# Patient Record
Sex: MALE | Race: WHITE | NOT HISPANIC OR LATINO
[De-identification: names, ages, dates, MRNs, and addresses within clinical notes are randomized per-mention and may not be internally consistent; named-entity substitution may affect disease eponyms.]

---

## 2019-11-10 ENCOUNTER — TRANSCRIPTION ENCOUNTER (OUTPATIENT)
Age: 79
End: 2019-11-10

## 2019-12-13 ENCOUNTER — LABORATORY RESULT (OUTPATIENT)
Age: 79
End: 2019-12-13

## 2019-12-13 ENCOUNTER — APPOINTMENT (OUTPATIENT)
Dept: PULMONOLOGY | Facility: CLINIC | Age: 79
End: 2019-12-13
Payer: MEDICARE

## 2019-12-13 VITALS
OXYGEN SATURATION: 100 % | WEIGHT: 168 LBS | TEMPERATURE: 98.7 F | HEART RATE: 66 BPM | DIASTOLIC BLOOD PRESSURE: 79 MMHG | SYSTOLIC BLOOD PRESSURE: 128 MMHG | HEIGHT: 71 IN | RESPIRATION RATE: 15 BRPM | BODY MASS INDEX: 23.52 KG/M2

## 2019-12-13 DIAGNOSIS — R91.8 OTHER NONSPECIFIC ABNORMAL FINDING OF LUNG FIELD: ICD-10-CM

## 2019-12-13 PROCEDURE — 94750: CPT

## 2019-12-13 PROCEDURE — 94726 PLETHYSMOGRAPHY LUNG VOLUMES: CPT

## 2019-12-13 PROCEDURE — 99204 OFFICE O/P NEW MOD 45 MIN: CPT | Mod: 25

## 2019-12-13 PROCEDURE — 36415 COLL VENOUS BLD VENIPUNCTURE: CPT

## 2019-12-13 PROCEDURE — 94664 DEMO&/EVAL PT USE INHALER: CPT | Mod: 59

## 2019-12-13 PROCEDURE — 94060 EVALUATION OF WHEEZING: CPT

## 2019-12-13 PROCEDURE — 88738 HGB QUANT TRANSCUTANEOUS: CPT

## 2019-12-13 PROCEDURE — 94729 DIFFUSING CAPACITY: CPT

## 2019-12-13 RX ORDER — TAMSULOSIN HYDROCHLORIDE 0.4 MG/1
0.4 CAPSULE ORAL
Refills: 0 | Status: ACTIVE | COMMUNITY

## 2019-12-13 NOTE — PHYSICAL EXAM
[General Appearance - Well Developed] : well developed [General Appearance - Well Nourished] : well nourished [Jugular Venous Distention Increased] : there was no jugular-venous distention [Thyroid Diffuse Enlargement] : the thyroid was not enlarged [Heart Sounds] : normal S1 and S2 [Murmurs] : no murmurs present [Auscultation Breath Sounds / Voice Sounds] : lungs were clear to auscultation bilaterally [Lungs Percussion] : the lungs were normal to percussion [Abdomen Soft] : soft [Abdomen Tenderness] : non-tender [] : no hepato-splenomegaly [Abdomen Mass (___ Cm)] : no abdominal mass palpated [Cyanosis, Localized] : no localized cyanosis [Nail Clubbing] : no clubbing of the fingernails

## 2019-12-16 LAB
ANA SER IF-ACNC: NEGATIVE
CK SERPL-CCNC: 140 U/L
CRP SERPL-MCNC: <0.1 MG/DL
ENA JO1 AB SER IA-ACNC: <0.2 AL
ENA SCL70 IGG SER IA-ACNC: <0.2 AL
ERYTHROCYTE [SEDIMENTATION RATE] IN BLOOD BY WESTERGREN METHOD: 8 MM/HR
HIV1+2 AB SPEC QL IA.RAPID: NONREACTIVE
MPO AB + PR3 PNL SER: NORMAL
POCT - HEMOGLOBIN (HGB), QUANTITATIVE, TRANSCUTANEOUS: 11
RHEUMATOID FACT SER QL: <10 IU/ML

## 2019-12-16 NOTE — ASSESSMENT
[FreeTextEntry1] : Repeat CT 6 months with prone images.\par Labs drawn in office today\par F/U sooner if change in respiratory status.

## 2019-12-16 NOTE — PROCEDURE
[FreeTextEntry1] : 12/13/2019\par Pulmonary function testing\par FEV1, FVC, and FEV1/FVC are within normal limits. There was not a significant response to inhaled bronchodilator. TLC is normal. FRC is increased. RV is increased.  RV/TLC ratio is increased. Resistance and specific conductance are normal. Single breath diffusion capacity is normal. \par \par CT reviewed\par note: tree in bud infiltrates, mild bronchiectasis\par Subpleural ground glass and some fibrotic opacities predom. posterior\par Nodules maximum 8 mm

## 2019-12-16 NOTE — HISTORY OF PRESENT ILLNESS
[FreeTextEntry1] : YAMILKA SUAZO is a 79 year old  M referred for pulmonary evaluation for wt loss\par \par Recent CT C/A/P recently told small spot on lung. Was not concerned. \par \par No significant cough, wheezing, chest pain or SOB.\par \par Wt loss approx. 40 pounds few years. Eating well\par \par No fever, chills, night sweats. Recently wt more stable.\par \par \par Past pulmonary history. N\par Occupational Exposure. [place] present on 9/11. Out for 1 week\par Family history of pulmonary disease. N\par Recent travel [place] July\par Pets N\par \par Some stress and anxiety\par Sleeps OK\par \par Some difficulty with swallowing. Has to get up from table and take fluids. Sometimes small particles can get stuck such as rice. \par Had achalasia as young adult. \par \par Told Quantiferon negative

## 2019-12-16 NOTE — DISCUSSION/SUMMARY
[FreeTextEntry1] : CT findings most likely related to prior injury or low-grade chronic infectious etiologies such as atypical mycobacterial disease. Less likely systemic inflammatory process.\par Possibility of silent aspiration is also a consideration in this patient.\par \par Some of the radiographic changes may be related to position and on repeat CAT scan we will obtain prone images.\par \par Unlikely weight loss is related to abnormalities noted on CAT scan of the chest

## 2019-12-16 NOTE — CONSULT LETTER
[Dear  ___] : Dear ~DWIGHT, [Consult Letter:] : I had the pleasure of evaluating your patient, [unfilled]. [Please see my note below.] : Please see my note below. [Consult Closing:] : Thank you very much for allowing me to participate in the care of this patient.  If you have any questions, please do not hesitate to contact me. [Sincerely,] : Sincerely, [FreeTextEntry2] : Carlos Marin MD [FreeTextEntry3] : Stas Sanz MD FCCP\par

## 2019-12-17 LAB — ACE BLD-CCNC: 23 U/L

## 2020-07-20 ENCOUNTER — APPOINTMENT (OUTPATIENT)
Dept: GASTROENTEROLOGY | Facility: CLINIC | Age: 80
End: 2020-07-20
Payer: MEDICARE

## 2020-07-20 DIAGNOSIS — K22.0 ACHALASIA OF CARDIA: ICD-10-CM

## 2020-07-20 PROCEDURE — 99442: CPT | Mod: 95

## 2020-08-02 DIAGNOSIS — Z01.818 ENCOUNTER FOR OTHER PREPROCEDURAL EXAMINATION: ICD-10-CM

## 2020-08-03 ENCOUNTER — APPOINTMENT (OUTPATIENT)
Dept: DISASTER EMERGENCY | Facility: CLINIC | Age: 80
End: 2020-08-03

## 2020-08-03 LAB — SARS-COV-2 N GENE NPH QL NAA+PROBE: NOT DETECTED

## 2020-08-09 ENCOUNTER — APPOINTMENT (OUTPATIENT)
Dept: DISASTER EMERGENCY | Facility: CLINIC | Age: 80
End: 2020-08-09

## 2020-08-09 LAB — SARS-COV-2 N GENE NPH QL NAA+PROBE: NOT DETECTED

## 2020-08-12 ENCOUNTER — OUTPATIENT (OUTPATIENT)
Dept: OUTPATIENT SERVICES | Facility: HOSPITAL | Age: 80
LOS: 1 days | Discharge: HOME | End: 2020-08-12
Payer: COMMERCIAL

## 2020-08-12 ENCOUNTER — APPOINTMENT (OUTPATIENT)
Dept: GASTROENTEROLOGY | Facility: HOSPITAL | Age: 80
End: 2020-08-12

## 2020-08-12 VITALS
SYSTOLIC BLOOD PRESSURE: 108 MMHG | HEART RATE: 65 BPM | WEIGHT: 151.02 LBS | TEMPERATURE: 97 F | HEIGHT: 70 IN | OXYGEN SATURATION: 100 % | DIASTOLIC BLOOD PRESSURE: 67 MMHG

## 2020-08-12 DIAGNOSIS — K22.0 ACHALASIA OF CARDIA: ICD-10-CM

## 2020-08-12 PROCEDURE — 91037 ESOPH IMPED FUNCTION TEST: CPT | Mod: 26,GC

## 2020-08-12 PROCEDURE — 91010 ESOPHAGUS MOTILITY STUDY: CPT | Mod: 26,GC

## 2020-08-24 PROBLEM — K22.0 ACHALASIA: Status: ACTIVE | Noted: 2020-07-20

## 2020-11-14 ENCOUNTER — APPOINTMENT (OUTPATIENT)
Dept: DISASTER EMERGENCY | Facility: CLINIC | Age: 80
End: 2020-11-14

## 2020-11-15 LAB — SARS-COV-2 N GENE NPH QL NAA+PROBE: NOT DETECTED

## 2022-01-25 ENCOUNTER — INPATIENT (INPATIENT)
Facility: HOSPITAL | Age: 82
LOS: 5 days | Discharge: HOME HEALTH | DRG: 563 | End: 2022-01-31
Attending: INTERNAL MEDICINE | Admitting: INTERNAL MEDICINE
Payer: MEDICARE

## 2022-01-25 VITALS
TEMPERATURE: 98 F | RESPIRATION RATE: 16 BRPM | OXYGEN SATURATION: 99 % | WEIGHT: 175.93 LBS | DIASTOLIC BLOOD PRESSURE: 83 MMHG | HEIGHT: 70 IN | SYSTOLIC BLOOD PRESSURE: 146 MMHG | HEART RATE: 77 BPM

## 2022-01-25 DIAGNOSIS — S42.209A UNSPECIFIED FRACTURE OF UPPER END OF UNSPECIFIED HUMERUS, INITIAL ENCOUNTER FOR CLOSED FRACTURE: ICD-10-CM

## 2022-01-25 DIAGNOSIS — S42.309A UNSPECIFIED FRACTURE OF SHAFT OF HUMERUS, UNSPECIFIED ARM, INITIAL ENCOUNTER FOR CLOSED FRACTURE: ICD-10-CM

## 2022-01-25 DIAGNOSIS — M89.9 DISORDER OF BONE, UNSPECIFIED: ICD-10-CM

## 2022-01-25 DIAGNOSIS — K22.0 ACHALASIA OF CARDIA: ICD-10-CM

## 2022-01-25 DIAGNOSIS — R91.8 OTHER NONSPECIFIC ABNORMAL FINDING OF LUNG FIELD: ICD-10-CM

## 2022-01-25 DIAGNOSIS — Z93.1 GASTROSTOMY STATUS: Chronic | ICD-10-CM
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
## 2022-01-25 DIAGNOSIS — Z29.9 ENCOUNTER FOR PROPHYLACTIC MEASURES, UNSPECIFIED: ICD-10-CM

## 2022-01-25 LAB
ACANTHOCYTES BLD QL SMEAR: SLIGHT — SIGNIFICANT CHANGE UP
ALBUMIN SERPL ELPH-MCNC: 4.1 G/DL — SIGNIFICANT CHANGE UP (ref 3.3–5)
ALP SERPL-CCNC: 78 U/L — SIGNIFICANT CHANGE UP (ref 40–120)
ALT FLD-CCNC: 41 U/L — SIGNIFICANT CHANGE UP (ref 10–45)
ANION GAP SERPL CALC-SCNC: 10 MMOL/L — SIGNIFICANT CHANGE UP (ref 5–17)
ANISOCYTOSIS BLD QL: SLIGHT — SIGNIFICANT CHANGE UP
AST SERPL-CCNC: 24 U/L — SIGNIFICANT CHANGE UP (ref 10–40)
BASOPHILS # BLD AUTO: 0 K/UL — SIGNIFICANT CHANGE UP (ref 0–0.2)
BASOPHILS NFR BLD AUTO: 0 % — SIGNIFICANT CHANGE UP (ref 0–2)
BILIRUB SERPL-MCNC: 0.4 MG/DL — SIGNIFICANT CHANGE UP (ref 0.2–1.2)
BUN SERPL-MCNC: 28 MG/DL — HIGH (ref 7–23)
CALCIUM SERPL-MCNC: 9.1 MG/DL — SIGNIFICANT CHANGE UP (ref 8.4–10.5)
CHLORIDE SERPL-SCNC: 103 MMOL/L — SIGNIFICANT CHANGE UP (ref 96–108)
CO2 SERPL-SCNC: 25 MMOL/L — SIGNIFICANT CHANGE UP (ref 22–31)
CREAT SERPL-MCNC: 0.67 MG/DL — SIGNIFICANT CHANGE UP (ref 0.5–1.3)
DACRYOCYTES BLD QL SMEAR: SLIGHT — SIGNIFICANT CHANGE UP
ELLIPTOCYTES BLD QL SMEAR: SLIGHT — SIGNIFICANT CHANGE UP
EOSINOPHIL # BLD AUTO: 0 K/UL — SIGNIFICANT CHANGE UP (ref 0–0.5)
EOSINOPHIL NFR BLD AUTO: 0 % — SIGNIFICANT CHANGE UP (ref 0–6)
GLUCOSE SERPL-MCNC: 130 MG/DL — HIGH (ref 70–99)
HCT VFR BLD CALC: 30.6 % — LOW (ref 39–50)
HGB BLD-MCNC: 9.6 G/DL — LOW (ref 13–17)
HYPOCHROMIA BLD QL: SLIGHT — SIGNIFICANT CHANGE UP
LYMPHOCYTES # BLD AUTO: 0.35 K/UL — LOW (ref 1–3.3)
LYMPHOCYTES # BLD AUTO: 2.6 % — LOW (ref 13–44)
MACROCYTES BLD QL: SLIGHT — SIGNIFICANT CHANGE UP
MANUAL SMEAR VERIFICATION: SIGNIFICANT CHANGE UP
MCHC RBC-ENTMCNC: 21.7 PG — LOW (ref 27–34)
MCHC RBC-ENTMCNC: 31.4 GM/DL — LOW (ref 32–36)
MCV RBC AUTO: 69.2 FL — LOW (ref 80–100)
MICROCYTES BLD QL: SIGNIFICANT CHANGE UP
MONOCYTES # BLD AUTO: 0.58 K/UL — SIGNIFICANT CHANGE UP (ref 0–0.9)
MONOCYTES NFR BLD AUTO: 4.4 % — SIGNIFICANT CHANGE UP (ref 2–14)
NEUTROPHILS # BLD AUTO: 12.24 K/UL — HIGH (ref 1.8–7.4)
NEUTROPHILS NFR BLD AUTO: 90.3 % — HIGH (ref 43–77)
NEUTS BAND # BLD: 1.8 % — SIGNIFICANT CHANGE UP (ref 0–8)
OVALOCYTES BLD QL SMEAR: SLIGHT — SIGNIFICANT CHANGE UP
PLAT MORPH BLD: NORMAL — SIGNIFICANT CHANGE UP
PLATELET # BLD AUTO: 190 K/UL — SIGNIFICANT CHANGE UP (ref 150–400)
POLYCHROMASIA BLD QL SMEAR: SLIGHT — SIGNIFICANT CHANGE UP
POTASSIUM SERPL-MCNC: 4.3 MMOL/L — SIGNIFICANT CHANGE UP (ref 3.5–5.3)
POTASSIUM SERPL-SCNC: 4.3 MMOL/L — SIGNIFICANT CHANGE UP (ref 3.5–5.3)
PROCALCITONIN SERPL-MCNC: 0.04 NG/ML — SIGNIFICANT CHANGE UP (ref 0.02–0.1)
PROT SERPL-MCNC: 6.7 G/DL — SIGNIFICANT CHANGE UP (ref 6–8.3)
RBC # BLD: 4.42 M/UL — SIGNIFICANT CHANGE UP (ref 4.2–5.8)
RBC # FLD: 15.8 % — HIGH (ref 10.3–14.5)
RBC BLD AUTO: ABNORMAL
SARS-COV-2 RNA SPEC QL NAA+PROBE: SIGNIFICANT CHANGE UP
SCHISTOCYTES BLD QL AUTO: SLIGHT — SIGNIFICANT CHANGE UP
SODIUM SERPL-SCNC: 138 MMOL/L — SIGNIFICANT CHANGE UP (ref 135–145)
TARGETS BLD QL SMEAR: SLIGHT — SIGNIFICANT CHANGE UP
VARIANT LYMPHS # BLD: 0.9 % — SIGNIFICANT CHANGE UP (ref 0–6)
WBC # BLD: 13.29 K/UL — HIGH (ref 3.8–10.5)
WBC # FLD AUTO: 13.29 K/UL — HIGH (ref 3.8–10.5)

## 2022-01-25 PROCEDURE — 72170 X-RAY EXAM OF PELVIS: CPT | Mod: 26

## 2022-01-25 PROCEDURE — 71045 X-RAY EXAM CHEST 1 VIEW: CPT | Mod: 26

## 2022-01-25 PROCEDURE — 99285 EMERGENCY DEPT VISIT HI MDM: CPT | Mod: 25

## 2022-01-25 PROCEDURE — 73070 X-RAY EXAM OF ELBOW: CPT | Mod: 26,LT

## 2022-01-25 PROCEDURE — 72125 CT NECK SPINE W/O DYE: CPT | Mod: 26,MA

## 2022-01-25 PROCEDURE — 83020 HEMOGLOBIN ELECTROPHORESIS: CPT | Mod: 26

## 2022-01-25 PROCEDURE — 99223 1ST HOSP IP/OBS HIGH 75: CPT

## 2022-01-25 PROCEDURE — 73060 X-RAY EXAM OF HUMERUS: CPT | Mod: 26,LT

## 2022-01-25 PROCEDURE — 73020 X-RAY EXAM OF SHOULDER: CPT | Mod: 26,LT,59

## 2022-01-25 PROCEDURE — 73030 X-RAY EXAM OF SHOULDER: CPT | Mod: 26,LT

## 2022-01-25 PROCEDURE — 70450 CT HEAD/BRAIN W/O DYE: CPT | Mod: 26,MA

## 2022-01-25 PROCEDURE — 93010 ELECTROCARDIOGRAM REPORT: CPT

## 2022-01-25 RX ORDER — POLYETHYLENE GLYCOL 3350 17 G/17G
17 POWDER, FOR SOLUTION ORAL DAILY
Refills: 0 | Status: DISCONTINUED | OUTPATIENT
Start: 2022-01-25 | End: 2022-01-31

## 2022-01-25 RX ORDER — FLUOXETINE HCL 10 MG
20 CAPSULE ORAL DAILY
Refills: 0 | Status: DISCONTINUED | OUTPATIENT
Start: 2022-01-25 | End: 2022-01-31

## 2022-01-25 RX ORDER — OXYCODONE HYDROCHLORIDE 5 MG/1
2.5 TABLET ORAL EVERY 6 HOURS
Refills: 0 | Status: DISCONTINUED | OUTPATIENT
Start: 2022-01-25 | End: 2022-01-31

## 2022-01-25 RX ORDER — ACETAMINOPHEN 500 MG
650 TABLET ORAL EVERY 6 HOURS
Refills: 0 | Status: DISCONTINUED | OUTPATIENT
Start: 2022-01-25 | End: 2022-01-31

## 2022-01-25 RX ORDER — LANOLIN ALCOHOL/MO/W.PET/CERES
3 CREAM (GRAM) TOPICAL AT BEDTIME
Refills: 0 | Status: DISCONTINUED | OUTPATIENT
Start: 2022-01-25 | End: 2022-01-31

## 2022-01-25 RX ORDER — ACETAMINOPHEN 500 MG
650 TABLET ORAL EVERY 6 HOURS
Refills: 0 | Status: DISCONTINUED | OUTPATIENT
Start: 2022-01-25 | End: 2022-01-25

## 2022-01-25 RX ORDER — ENOXAPARIN SODIUM 100 MG/ML
40 INJECTION SUBCUTANEOUS DAILY
Refills: 0 | Status: DISCONTINUED | OUTPATIENT
Start: 2022-01-25 | End: 2022-01-31

## 2022-01-25 RX ORDER — ACETAMINOPHEN 500 MG
650 TABLET ORAL ONCE
Refills: 0 | Status: COMPLETED | OUTPATIENT
Start: 2022-01-25 | End: 2022-01-25

## 2022-01-25 RX ADMIN — Medication 3 MILLIGRAM(S): at 23:02

## 2022-01-25 RX ADMIN — Medication 650 MILLIGRAM(S): at 16:59

## 2022-01-25 NOTE — CONSULT NOTE ADULT - ASSESSMENT
A/P: 82yo Male with L proximal humerus fracture    Pain control  NWB ANTONETTEE in sling  PT/OT  Please call if you have further question  Can follow up with Dr. Hernandez in 1-2 weeks or upon discharge    Ortho [id number]

## 2022-01-25 NOTE — H&P ADULT - NSHPLABSRESULTS_GEN_ALL_CORE
EKG reviewed.   Imaging reviewed. CXR shows L mid lung opacity concerning for developing PNA  Labs personally reviewed.     < from: CT Head No Cont (01.25.22 @ 16:32) >    There is no acute hemorrhage mass mass effect seen    There is evidence of an expansile lytic lesion seen involving the high   left frontal calvarium. This could be compatible with underlying   metastasis, multiple myeloma, lymphoma or hemangioma or other similar   lesions cannot be entirely excluded. Contrast enhanced MRI of the brain   can be done to better characterize this finding.    Cervical spine:    Loss of the normal cervical lordosis seen    C2 is slightly anteriorly displaced relative C3 and C3 is slightly   anteriorly displaced relative C4. These findings likely the result of   chronic degenerative change    Fusion is seen involving the posterior elements of C2, C3 and C4 which is   likely result of degenerative changes.    Disc space narrowing templates for change in mass effect seen involving   the cervical spine is seen which is secondary to chronic degenerative   change    Bilateral hypertrophic facet changes are seen, these findings are   secondary tochronic degenerative changes.    Evaluation of the paraspinal soft tissues limited by lack of IV contrast   though grossly unremarkable.    The visualized portions of both lung apices appear clear.    IMPRESSION: Lytic lesion involving the calvarium as described above.    Degenerative changes involving the cervical spine.    --- End of Report ---    < end of copied text >

## 2022-01-25 NOTE — H&P ADULT - PROBLEM SELECTOR PLAN 3
Patient incidentally found to have an expansile lytic lesion seen involving the high left frontal calvarium on CTH. This could be compatible with underlying metastasis, multiple myeloma, lymphoma or hemangioma or other similar   - will obtain MRI brain w/ contrast for better characterization  - f/u SPEP, UPEP, alkaline phosphatase  - onc consult in am given concern for malignancy

## 2022-01-25 NOTE — H&P ADULT - PROBLEM SELECTOR PLAN 1
Patient presents with a mechanical fall, landing on his LUE. Xray imaging shows Comminuted proximal humerus fracture at the level of the surgical neck with severe apex anterior angulation  - ortho recs appreciated, NWB LUE in sling  - f/u PT/OT  - pain control  - Can follow up with Dr. Hernandez in 1-2 weeks or upon discharge

## 2022-01-25 NOTE — H&P ADULT - ASSESSMENT
81M with PMH of achalasia s/p PEG (planned for removal in Feb), now tolerating regular diet, presents s/p mechanical fall found to have L humerus fracture.

## 2022-01-25 NOTE — H&P ADULT - PROBLEM SELECTOR PLAN 4
Patient found to have a faint left mid lower lung opacity which may represent developing pneumonia. Patient denies any fevers or cough  - WBC mildly elevated at 13 with 1.8% bands  - f/u procal  - if elevated, will start on abx for CAP  - continue to trend CBC Patient found to have a faint left mid lower lung opacity which may represent developing pneumonia. Patient denies any fevers or cough  - WBC mildly elevated at 13 with 1.8% bands  - f/u procal  - if elevated, will start on abx for CAP  - if procal wnl, may need repeat imaging to f/u lung opacity  - continue to trend CBC Patient found to have a faint left mid lower lung opacity which may represent developing pneumonia. Patient denies any fevers or cough  - WBC mildly elevated at 13 with 1.8% bands  - f/u procal  - if elevated, will start on abx for CAP  - if procal wnl, may need CT chest to f/u lung opacity  - continue to trend CBC Patient found to have a faint left mid lower lung opacity which may represent developing pneumonia. Patient denies any fevers or cough  - WBC mildly elevated at 13 with 1.8% bands  - given procal is negative will obtain CT chest to assess lung opacity  - continue to trend CBC

## 2022-01-25 NOTE — CONSULT NOTE ADULT - SUBJECTIVE AND OBJECTIVE BOX
80yo Male RHD PMH of Achalasia s/p PEG tube c/o L shoulder pain s/p fall. Reports that he fell while getting the mail. Reports head strike. Patient denies LOC. Reports right posterior scalp pain. Patient denies numbness or tingling in the LUE. Patient denies any other injuries. Uses a walker at baseline.     PMH:  Achalasia      PSH: Denies     AH: NKDA    Meds: See med rec    T(C): 36.9 (01-25-22 @ 14:58)  HR: 77 (01-25-22 @ 14:58)  BP: 146/83 (01-25-22 @ 14:58)  RR: 16 (01-25-22 @ 14:58)  SpO2: 99% (01-25-22 @ 14:58)  Wt(kg): --    PE LUE:  Skin intact, ecchymosis over the shoulder    SILT axillary/med/rad/ulnar  +Motor AIN/PIN/Ulnar/Radial/Musc/Median,   +painless elbow/wrist ROM,   shoulder ROM limited 2/2 pain,   2+radial pulse, soft compartments.    Secondary:  No TTP over bony landmarks, SILT BL, ROM intact BL, distal pulses palpable.    Imaging:  XR demonstrating left proximal humerus fracture

## 2022-01-25 NOTE — ED PROVIDER NOTE - OBJECTIVE STATEMENT
81 year old male with history 81 year old male with history of achalasia s/p PEG tube, planned for removal in february, presenting s/p slip and fall with L shoulder/arm pain. States he was getting mail out of the mailbox when he slipped and fell backwards with +headstrike to posterior scalp, no LOC, denies any preceding chest pain, palpitations, dizziness, lightheadedness, N/V. Complains of minor R posterior scalp pain now in addition to significant L upper arm pain/limited ROM. Denies SOB, abdominal pain, LE pain, numbness, focal weakness, vision changes. Walks with walker at baseline.

## 2022-01-25 NOTE — H&P ADULT - PROBLEM SELECTOR PLAN 2
Patient s/p mechanical fall, now with humerus fracture. Xray pelvis w/o fracture  - f/u PT/OT consult  - fall precautions  - pain control with prn oxycodone

## 2022-01-25 NOTE — ED PROVIDER NOTE - ATTENDING CONTRIBUTION TO CARE
81M hx of achalasia sp PEG tube, 81M hx of achalasia sp PEG tube here with mechanical slip and fall, fell backward striking posterior head and L shoulder. Now C/o L posterior head pain, No vision changes, no neck pain, no back pain, no NV, no scalp laceration. Also c/o L shoulder pain w dec ROM, anterior shoulder ecchymosis and swelling, distal NV intact, FROM in elbow and wrist on L. Palp pulses. BL BS, abd soft, pelvis stable, FROM in LE and RUE w/o pain. Suspect L prox humerus fx, xrays, CT head and C spine given head strike and age though currently A&Ox3, neuro intact. Will likely require admission as he ambulates w walker at baseline and will need a sling for the LUE.

## 2022-01-25 NOTE — ED ADULT NURSE NOTE - OBJECTIVE STATEMENT
81 year old male with history of achalasia s/p PEG tube, planned for removal in february, presenting s/p slip and fall with L shoulder/arm pain. States he was getting mail out of the mailbox when he slipped and fell backwards with +headstrike to posterior scalp, no LOC, denies any preceding chest pain, palpitations, dizziness, lightheadedness, N/V. Complains of minor R posterior scalp pain now in addition to significant L upper arm pain/limited ROM. Denies SOB, abdominal pain, LE pain, numbness, focal weakness, vision changes. Walks with walker at baseline

## 2022-01-25 NOTE — H&P ADULT - HISTORY OF PRESENT ILLNESS
81M with PMH of achalasia s/p PEG (planned for removal in Feb), now tolerating regular diet, presents s/p mechanical fall. Patient went to get his mail when he slipped and fell on his L side of the body. He hit his head however denies any loss of consciousness. No lightheadedness, dizziness, chest pain, palpitations, or shortness of breath prior to the fall. After the fall, patient started to have L shoulder pain. Patient walks with a walker. Patient denies any fevers or chills. No cough, shortness of breath, nausea, vomiting, abdominal pain, diarrhea, or dysuria. No melena or hematochezia.     In the ED, T is 98.5, HR 77, /83, RR 16 satting 99% on room air. Patient received tylenol 650mg X1.

## 2022-01-25 NOTE — ED PROVIDER NOTE - PHYSICAL EXAMINATION
Gen - NAD; comfortable appearing; A+Ox3   HEENT - NCAT, EOMI, moist mucous membranes  Neck - supple, no c-spine tenderness, >45 degree ROM b/l  Resp - CTAB, no increased WOB  CV -  RRR, no m/r/g  Abd - soft, NT, ND; no guarding or rebound  Back - no midline or CVA tenderness  MSK - 5/5 strength and FROM b/l UE and LE with exception to L shoulder, ROM to joint limited 2/2 pain, +ecchymosis with palpable tender deformity to L medial deltoid region, NVI distally  Extrem - no LE edema/erythema/tenderness  Neuro - no focal motor or sensation deficits except as above

## 2022-01-25 NOTE — ED PROVIDER NOTE - PROGRESS NOTE DETAILS
Trell PGY-2: Patient reassessed, resting comfortably. Discussed XR imaging showing acute L prox humeral fx. Also discussed CTH findings of lytic calvarial lesion concerning for malignancy. Patient expresses clear understanding. Dispo still pending ambulatory status and ortho eval.

## 2022-01-25 NOTE — H&P ADULT - MUSCULOSKELETAL
no joint swelling/no joint erythema/decreased ROM due to pain/diminished strength details… detailed exam

## 2022-01-25 NOTE — ED PROVIDER NOTE - CLINICAL SUMMARY MEDICAL DECISION MAKING FREE TEXT BOX
81 year old male with history of achalasia s/p PEG tube, planned for removal in february, presenting s/p slip and fall with L shoulder/arm pain. 81 year old male with history of achalasia s/p PEG tube, planned for removal in february, presenting s/p slip and fall with L shoulder/arm pain. Appears comfortable here with reassuring vitals and exam but concern for acute L shoulder/arm fx vs dislocation given limited ROM and deformity on exam. Will obtain XR imaging to assess extremity in addition to CTH/c-spine given advanced age. Dispo pending

## 2022-01-26 LAB
ALBUMIN SERPL ELPH-MCNC: 3.7 G/DL — SIGNIFICANT CHANGE UP (ref 3.3–5)
ALP SERPL-CCNC: 69 U/L — SIGNIFICANT CHANGE UP (ref 40–120)
ALT FLD-CCNC: 33 U/L — SIGNIFICANT CHANGE UP (ref 10–45)
ANION GAP SERPL CALC-SCNC: 10 MMOL/L — SIGNIFICANT CHANGE UP (ref 5–17)
AST SERPL-CCNC: 20 U/L — SIGNIFICANT CHANGE UP (ref 10–40)
BILIRUB SERPL-MCNC: 0.6 MG/DL — SIGNIFICANT CHANGE UP (ref 0.2–1.2)
BUN SERPL-MCNC: 26 MG/DL — HIGH (ref 7–23)
CALCIUM SERPL-MCNC: 9.2 MG/DL — SIGNIFICANT CHANGE UP (ref 8.4–10.5)
CHLORIDE SERPL-SCNC: 103 MMOL/L — SIGNIFICANT CHANGE UP (ref 96–108)
CO2 SERPL-SCNC: 23 MMOL/L — SIGNIFICANT CHANGE UP (ref 22–31)
CREAT SERPL-MCNC: 0.58 MG/DL — SIGNIFICANT CHANGE UP (ref 0.5–1.3)
FERRITIN SERPL-MCNC: 446 NG/ML — HIGH (ref 30–400)
GLUCOSE BLDC GLUCOMTR-MCNC: 113 MG/DL — HIGH (ref 70–99)
GLUCOSE BLDC GLUCOMTR-MCNC: 136 MG/DL — HIGH (ref 70–99)
GLUCOSE SERPL-MCNC: 95 MG/DL — SIGNIFICANT CHANGE UP (ref 70–99)
HAPTOGLOB SERPL-MCNC: 79 MG/DL — SIGNIFICANT CHANGE UP (ref 34–200)
HCT VFR BLD CALC: 29.4 % — LOW (ref 39–50)
HGB BLD-MCNC: 9.2 G/DL — LOW (ref 13–17)
IGA FLD-MCNC: 246 MG/DL — SIGNIFICANT CHANGE UP (ref 84–499)
IGG FLD-MCNC: 1099 MG/DL — SIGNIFICANT CHANGE UP (ref 610–1660)
IGM SERPL-MCNC: 107 MG/DL — SIGNIFICANT CHANGE UP (ref 35–242)
IRON SATN MFR SERPL: 15 % — LOW (ref 16–55)
IRON SATN MFR SERPL: 36 UG/DL — LOW (ref 45–165)
KAPPA LC SER QL IFE: 3.39 MG/DL — HIGH (ref 0.33–1.94)
KAPPA LC SER QL IFE: 3.39 MG/DL — HIGH (ref 0.33–1.94)
KAPPA/LAMBDA FREE LIGHT CHAIN RATIO, SERUM: 1.76 RATIO — HIGH (ref 0.26–1.65)
KAPPA/LAMBDA FREE LIGHT CHAIN RATIO, SERUM: 1.76 RATIO — HIGH (ref 0.26–1.65)
LAMBDA LC SER QL IFE: 1.93 MG/DL — SIGNIFICANT CHANGE UP (ref 0.57–2.63)
LAMBDA LC SER QL IFE: 1.93 MG/DL — SIGNIFICANT CHANGE UP (ref 0.57–2.63)
MCHC RBC-ENTMCNC: 21.4 PG — LOW (ref 27–34)
MCHC RBC-ENTMCNC: 31.3 GM/DL — LOW (ref 32–36)
MCV RBC AUTO: 68.4 FL — LOW (ref 80–100)
NRBC # BLD: 0 /100 WBCS — SIGNIFICANT CHANGE UP (ref 0–0)
PLATELET # BLD AUTO: 172 K/UL — SIGNIFICANT CHANGE UP (ref 150–400)
POTASSIUM SERPL-MCNC: 4.1 MMOL/L — SIGNIFICANT CHANGE UP (ref 3.5–5.3)
POTASSIUM SERPL-SCNC: 4.1 MMOL/L — SIGNIFICANT CHANGE UP (ref 3.5–5.3)
PROT ?TM UR-MCNC: 21 MG/DL — HIGH (ref 0–12)
PROT SERPL-MCNC: 6.3 G/DL — SIGNIFICANT CHANGE UP (ref 6–8.3)
RBC # BLD: 4.19 M/UL — LOW (ref 4.2–5.8)
RBC # BLD: 4.3 M/UL — SIGNIFICANT CHANGE UP (ref 4.2–5.8)
RBC # FLD: 15.8 % — HIGH (ref 10.3–14.5)
RETICS #: 52.4 K/UL — SIGNIFICANT CHANGE UP (ref 25–125)
RETICS/RBC NFR: 1.3 % — SIGNIFICANT CHANGE UP (ref 0.5–2.5)
SODIUM SERPL-SCNC: 136 MMOL/L — SIGNIFICANT CHANGE UP (ref 135–145)
TIBC SERPL-MCNC: 242 UG/DL — SIGNIFICANT CHANGE UP (ref 220–430)
UIBC SERPL-MCNC: 206 UG/DL — SIGNIFICANT CHANGE UP (ref 110–370)
VIT B12 SERPL-MCNC: 567 PG/ML — SIGNIFICANT CHANGE UP (ref 232–1245)
WBC # BLD: 7.23 K/UL — SIGNIFICANT CHANGE UP (ref 3.8–10.5)
WBC # FLD AUTO: 7.23 K/UL — SIGNIFICANT CHANGE UP (ref 3.8–10.5)

## 2022-01-26 PROCEDURE — 23600 CLTX PROX HUMRL FX W/O MNPJ: CPT | Mod: LT

## 2022-01-26 PROCEDURE — 71250 CT THORAX DX C-: CPT | Mod: 26

## 2022-01-26 RX ADMIN — ENOXAPARIN SODIUM 40 MILLIGRAM(S): 100 INJECTION SUBCUTANEOUS at 13:35

## 2022-01-26 RX ADMIN — Medication 20 MILLIGRAM(S): at 17:17

## 2022-01-26 RX ADMIN — POLYETHYLENE GLYCOL 3350 17 GRAM(S): 17 POWDER, FOR SOLUTION ORAL at 13:35

## 2022-01-26 RX ADMIN — Medication 3 MILLIGRAM(S): at 22:14

## 2022-01-26 NOTE — OCCUPATIONAL THERAPY INITIAL EVALUATION ADULT - DIAGNOSIS, OT EVAL
Pt demonstrated decreased b/l UE ROM, weakness, and decreased balance limiting ADLs and functional mobility.

## 2022-01-26 NOTE — PHYSICAL THERAPY INITIAL EVALUATION ADULT - ADDITIONAL COMMENTS
As per pt, pt lives in a private house w/ spouse and 1 steps to enter w/ UHR. PTA pt was independent w/ all mobility w/ RW and ADLs.

## 2022-01-26 NOTE — OCCUPATIONAL THERAPY INITIAL EVALUATION ADULT - PRECAUTIONS/LIMITATIONS, REHAB EVAL
CT Head (1/25): Lytic lesion involving the calvarium as described above.Degenerative changes involving the cervical spine. Xray shoulder(1/25): Comminuted proximal humerus fracture at the level of the surgical neck CT Head (1/25): Lytic lesion involving the calvarium as described above.Degenerative changes involving the cervical spine. Xray shoulder(1/25): Comminuted proximal humerus fracture at the level of the surgical neck/fall precautions

## 2022-01-26 NOTE — PROGRESS NOTE ADULT - SUBJECTIVE AND OBJECTIVE BOX
afberile    REVIEW OF SYSTEMS:  GEN: no fever,    no chills  RESP: no SOB,   no cough  CVS: no chest pain,   no palpitations  GI: no abdominal pain,   no nausea,   no vomiting,   no constipation,   no diarrhea  : no dysuria,   no frequency  NEURO: no headache,   no dizziness  PSYCH: no depression,   not anxious  Derm : no rash    MEDICATIONS  (STANDING):  enoxaparin Injectable 40 milliGRAM(s) SubCutaneous daily  FLUoxetine 20 milliGRAM(s) Oral daily  melatonin 3 milliGRAM(s) Oral at bedtime  multivitamin 1 Tablet(s) Oral daily  polyethylene glycol 3350 17 Gram(s) Oral daily    MEDICATIONS  (PRN):  acetaminophen     Tablet .. 650 milliGRAM(s) Oral every 6 hours PRN Mild Pain (1 - 3), Moderate Pain (4 - 6)  oxyCODONE    IR 2.5 milliGRAM(s) Oral every 6 hours PRN Severe Pain (7 - 10)      Vital Signs Last 24 Hrs  T(C): 36.7 (26 Jan 2022 04:55), Max: 37.6 (25 Jan 2022 19:40)  T(F): 98.1 (26 Jan 2022 04:55), Max: 99.7 (25 Jan 2022 19:40)  HR: 63 (26 Jan 2022 04:55) (63 - 82)  BP: 142/83 (26 Jan 2022 04:55) (117/82 - 146/83)  BP(mean): --  RR: 18 (26 Jan 2022 04:55) (16 - 18)  SpO2: 95% (26 Jan 2022 04:55) (95% - 99%)  CAPILLARY BLOOD GLUCOSE        I&O's Summary      PHYSICAL EXAM:  HEAD:  Atraumatic, Normocephalic  NECK: Supple, No   JVD  CHEST/LUNG:   no     rales,     no,    rhonchi  HEART: Regular rate and rhythm;         murmur  ABDOMEN: Soft, Nontender, ;   EXTREMITIES:    no    edema  NEUROLOGY:  alert    LABS:                        9.6    13.29 )-----------( 190      ( 25 Jan 2022 17:07 )             30.6     01-25    138  |  103  |  28<H>  ----------------------------<  130<H>  4.3   |  25  |  0.67    Ca    9.1      25 Jan 2022 17:07    TPro  6.7  /  Alb  4.1  /  TBili  0.4  /  DBili  x   /  AST  24  /  ALT  41  /  AlkPhos  78  01-25                            Consultant(s) Notes Reviewed:      Care Discussed with Consultants/Other Providers:

## 2022-01-26 NOTE — PHYSICAL THERAPY INITIAL EVALUATION ADULT - PERTINENT HX OF CURRENT PROBLEM, REHAB EVAL
Pt is a 81 M with PMH of achalasia s/p PEG, now tolerating regular diet, presents s/p mechanical fall. Patient went to get his mail when he slipped and fell on his L side of the body. He hit his head however denies any loss of consciousness. Found to have L proximal humerus fracture. CTH: Lytic lesion involving the calvarium

## 2022-01-26 NOTE — CONSULT NOTE ADULT - ASSESSMENT
Patient has h/o achalasia, severe protein calorie malnutrition.  He had 9/29/21 laproscopic J tube placement, Had SBO due to kinked segment of small bowel. He needed laproscopic realignment of J tube. He needed venting G tube as well in 10/2021.  He has been tolerating oral meals since Dec 2021 and G tube had been clamped with plan to remove it.  Review on labs indicated chronic anemia. No increase in creatinine or calcium  He was noted to have comminuted fracture of proximal humerus after fall seen by ortho.  CT head showed expansile lytic lesion seen involving the high   left frontal calvarium.   No other lytic lesions have been noted.  I have ordered paraprotenemia evaluation to evaluate further.  I have also ordered anemia w/u including for def given his GI history 81M with PMH of achalasia s/p PEG (planned for removal in Feb 2022), now tolerating regular diet, admitted 1/25/22 s/p mechanical fall. Patient went to get his mail when he slipped and fell on his L side of the body. He hit his head however denies any loss of consciousness. No lightheadedness, dizziness, chest pain, palpitations, or shortness of breath prior to the fall. After the fall, patient started to have L shoulder pain. Patient walks with a walker. Patient denies any fevers or chills. No cough, shortness of breath, nausea, vomiting, abdominal pain, diarrhea, or dysuria. No melena or hematochezia.   Seen for lytic lesion in skull with diff as above, MRI can be done to better define lesion, and anemia    He had 9/29/21 laproscopic J tube placement, Had SBO due to kinked segment of small bowel. He needed laproscopic realignment of J tube. He needed venting G tube as well in 10/2021 .  He has been tolerating oral meals since Dec 2021 and G tube had been clamped with plan to remove it.  Review on labs indicated chronic anemia. No increase in creatinine or calcium  He was noted to have comminuted fracture of proximal humerus after fall seen by ortho.  CT head showed expansile lytic lesion seen involving the high   left frontal calvarium.   No other lytic lesions have been noted.  I have ordered paraproteinemia evaluation to evaluate further.  I have also ordered anemia w/u including for def given his GI history for his anemia 81M with PMH of achalasia s/p PEG (planned for removal in Feb 2022), now tolerating regular diet, admitted 1/25/22 s/p mechanical fall. Patient went to get his mail when he slipped and fell on his L side of the body. He hit his head however denies any loss of consciousness. No lightheadedness, dizziness, chest pain, palpitations, or shortness of breath prior to the fall. After the fall, patient started to have L shoulder pain. Patient walks with a walker. Patient denies any fevers or chills. No cough, shortness of breath, nausea, vomiting, abdominal pain, diarrhea, or dysuria. No melena or hematochezia.   Seen for lytic lesion in skull with diff as above, MRI can be done to better define lesion, and anemia    He had 9/29/21 laproscopic J tube placement, Had SBO due to kinked segment of small bowel. He needed laproscopic realignment of J tube. He needed venting G tube as well in 10/2021 .  He has been tolerating oral meals since Dec 2021 and G tube had been clamped with plan to remove it.  Review on labs indicated chronic anemia. No increase in creatinine or calcium  He was noted to have comminuted fracture of proximal humerus after fall seen by ortho.  CT head showed expansile lytic lesion seen involving the high   left frontal calvarium.   No other lytic lesions have been noted.  I have ordered paraproteinemia evaluation to evaluate further.  I have also ordered anemia w/u including for def given his GI history for his anemia. HBEP was ordered as his microcytosis was more that expected from [facility]

## 2022-01-26 NOTE — OCCUPATIONAL THERAPY INITIAL EVALUATION ADULT - RANGE OF MOTION EXAMINATION, UPPER EXTREMITY
RUE AROM 1/2 range; LUE wrist AROM WFL/Right UE Active Assistive ROM was WFL  (within functional limits)

## 2022-01-26 NOTE — CONSULT NOTE ADULT - ASSESSMENT
humeral fracture  achalasia    patient no longer uses peg  achalasia was treated with [facility] (dr. rooney)   scheduled for peg removal next month  can offer to repeat as inpatient this friday  patient agrees  cont diet as tolerated  will follow

## 2022-01-26 NOTE — OCCUPATIONAL THERAPY INITIAL EVALUATION ADULT - LIVES WITH, PROFILE
Pt lives in pvt home +1 [address] and 13 steps inside w/ his wife. Pt has a walk in shower and a shower chair, uses a RW for fx mobility. Pt was independent with ADLs PTA./spouse

## 2022-01-26 NOTE — OCCUPATIONAL THERAPY INITIAL EVALUATION ADULT - PERTINENT HX OF CURRENT PROBLEM, REHAB EVAL
81M with PMH of achalasia s/p PEG (planned for removal in Feb), now tolerating regular diet, presents s/p mechanical fall. Patient went to get his mail when he slipped and fell on his L side of the body. He hit his head however denies any loss of consciousness.After the fall, patient started to have L shoulder pain. Patient walks with a walker.

## 2022-01-26 NOTE — OCCUPATIONAL THERAPY INITIAL EVALUATION ADULT - BALANCE TRAINING, PT EVAL
GOAL: Pt will improve dynamic standing balance to good in order to improve functional mobility in 4 weeks.

## 2022-01-26 NOTE — CONSULT NOTE ADULT - SUBJECTIVE AND OBJECTIVE BOX
[facility]  Allan Dale PA-C  [place] [address]  [phone]      Chief Complaint:  Patient is a 81y old  Male who presents with a chief complaint of fall, humerus fracture (26 Jan 2022 10:17)      HPI: 81M with PMH of achalasia s/p PEG (planned for removal in Feb), now tolerating regular diet, presents s/p mechanical fall. Patient went to get his mail when he slipped and fell on his L side of the body. He hit his head however denies any loss of consciousness. No lightheadedness, dizziness, chest pain, palpitations, or shortness of breath prior to the fall. After the fall, patient started to have L shoulder pain. Patient walks with a walker. Patient denies any fevers or chills. No cough, shortness of breath, nausea, vomiting, abdominal pain, diarrhea, or dysuria. No melena or hematochezia.     Allergies:  No Known Allergies      Medications:  acetaminophen     Tablet .. 650 milliGRAM(s) Oral every 6 hours PRN  enoxaparin Injectable 40 milliGRAM(s) SubCutaneous daily  FLUoxetine 20 milliGRAM(s) Oral daily  melatonin 3 milliGRAM(s) Oral at bedtime  oxyCODONE    IR 2.5 milliGRAM(s) Oral every 6 hours PRN  polyethylene glycol 3350 17 Gram(s) Oral daily      PMHX/PSHX:  Ganglion cyst of Left wrist    H/O repair of right rotator cuff    Eczema    Obesity    Achalasia    H/O repair of left rotator cuff    PEG (percutaneous endoscopic gastrostomy) status        Family history:  No pertinent family history in first degree relatives        Social History:     ROS:     General:  No wt loss, fevers, chills, night sweats, fatigue,   Eyes:  Good vision, no reported pain  ENT:  No sore throat, pain, runny nose, dysphagia  CV:  No pain, palpitations, hypo/hypertension  Resp:  No dyspnea, cough, tachypnea, wheezing  GI:  No pain, No nausea, No vomiting, No diarrhea, No constipation, No weight loss, No fever, No pruritis, No rectal bleeding, No tarry stools, No dysphagia,  :  No pain, bleeding, incontinence, nocturia  Muscle:  No pain, weakness  Neuro:  No weakness, tingling, memory problems  Psych:  No fatigue, insomnia, mood problems, depression  Endocrine:  No polyuria, polydipsia, cold/heat intolerance  Heme:  No petechiae, ecchymosis, easy bruisability  Skin:  No rash, tattoos, scars, edema      PHYSICAL EXAM:   Vital Signs:  Vital Signs Last 24 Hrs  T(C): 36.8 (26 Jan 2022 12:30), Max: 37.6 (25 Jan 2022 19:40)  T(F): 98.2 (26 Jan 2022 12:30), Max: 99.7 (25 Jan 2022 19:40)  HR: 64 (26 Jan 2022 12:30) (63 - 82)  BP: 108/68 (26 Jan 2022 12:30) (108/68 - 146/83)  BP(mean): --  RR: 18 (26 Jan 2022 12:30) (16 - 18)  SpO2: 99% (26 Jan 2022 12:30) (95% - 99%)  Daily Height in cm: 177.8 (25 Jan 2022 14:58)    Daily     GENERAL:  Appears stated age,   HEENT:  NC/AT,    CHEST:  Full & symmetric excursion,   HEART:  Regular rhythm  ABDOMEN:  Soft, non-tender, non-distended, peg in place    EXTEREMITIES:  no cyanosis,clubbing or edema  SKIN:  No rash  NEURO:  Alert,    LABS:                        9.2    7.23  )-----------( 172      ( 26 Jan 2022 08:36 )             29.4     01-26    136  |  103  |  26<H>  ----------------------------<  95  4.1   |  23  |  0.58    Ca    9.2      26 Jan 2022 07:09    TPro  6.3  /  Alb  3.7  /  TBili  0.6  /  DBili  x   /  AST  20  /  ALT  33  /  AlkPhos  69  01-26    LIVER FUNCTIONS - ( 26 Jan 2022 07:09 )  Alb: 3.7 g/dL / Pro: 6.3 g/dL / ALK PHOS: 69 U/L / ALT: 33 U/L / AST: 20 U/L / GGT: x                   Imaging:

## 2022-01-26 NOTE — CONSULT NOTE ADULT - SUBJECTIVE AND OBJECTIVE BOX
CHIEF COMPLAINT:Patient is a 81y old  Male who presents with a chief complaint of fall, humerus fracture (26 Jan 2022 06:10)      HPI:  81M with PMH of achalasia s/p PEG (planned for removal in Feb), now tolerating regular diet, presents s/p mechanical fall. Patient went to get his mail when he slipped and fell on his L side of the body. He hit his head however denies any loss of consciousness. No lightheadedness, dizziness, chest pain, palpitations, or shortness of breath prior to the fall. After the fall, patient started to have L shoulder pain. Patient walks with a walker. Patient denies any fevers or chills. No cough, shortness of breath, nausea, vomiting, abdominal pain, diarrhea, or dysuria. No melena or hematochezia.   In the ED, T is 98.5, HR 77, /83, RR 16 satting 99% on room air. Patient received tylenol 650mg X1.       PAST MEDICAL & SURGICAL HISTORY:  Achalasia    PEG (percutaneous endoscopic gastrostomy) status        MEDICATIONS  (STANDING):  enoxaparin Injectable 40 milliGRAM(s) SubCutaneous daily  FLUoxetine 20 milliGRAM(s) Oral daily  melatonin 3 milliGRAM(s) Oral at bedtime  polyethylene glycol 3350 17 Gram(s) Oral daily    MEDICATIONS  (PRN):  acetaminophen     Tablet .. 650 milliGRAM(s) Oral every 6 hours PRN Mild Pain (1 - 3), Moderate Pain (4 - 6)  oxyCODONE    IR 2.5 milliGRAM(s) Oral every 6 hours PRN Severe Pain (7 - 10)      FAMILY HISTORY:  No pertinent family history in first degree relatives        SOCIAL HISTORY:    [ ] Non-smoker  [ ] Smoker  [ ] Alcohol    Allergies    No Known Allergies    Intolerances    	    REVIEW OF SYSTEMS:  CONSTITUTIONAL: No fever, weight loss, or fatigue  EYES: No eye pain, visual disturbances, or discharge  ENT:  No difficulty hearing, tinnitus, vertigo; No sinus or throat pain  NECK: No pain or stiffness  RESPIRATORY: No cough, wheezing, chills or hemoptysis; No Shortness of Breath  CARDIOVASCULAR: No chest pain, palpitations, passing out, dizziness, or leg swelling  GASTROINTESTINAL: No abdominal or epigastric pain. No nausea, vomiting, or hematemesis; No diarrhea or constipation. No melena or hematochezia.  GENITOURINARY: No dysuria, frequency, hematuria, or incontinence  NEUROLOGICAL: No headaches, memory loss, loss of strength, numbness, or tremors  SKIN: No itching, burning, rashes, or lesions   LYMPH Nodes: No enlarged glands  ENDOCRINE: No heat or cold intolerance; No hair loss  MUSCULOSKELETAL: No joint pain or swelling; No muscle, back, or extremity pain  PSYCHIATRIC: No depression, anxiety, mood swings, or difficulty sleeping  HEME/LYMPH: No easy bruising, or bleeding gums  ALLERGY AND IMMUNOLOGIC: No hives or eczema	    [ ] All others negative	  [ ] Unable to obtain    PHYSICAL EXAM:  T(C): 36.7 (01-26-22 @ 04:55), Max: 37.6 (01-25-22 @ 19:40)  HR: 63 (01-26-22 @ 04:55) (63 - 82)  BP: 142/83 (01-26-22 @ 04:55) (117/82 - 146/83)  RR: 18 (01-26-22 @ 04:55) (16 - 18)  SpO2: 95% (01-26-22 @ 04:55) (95% - 99%)  Wt(kg): --  I&O's Summary      Appearance: Normal	  HEENT:   Normal oral mucosa, PERRL, EOMI	  Lymphatic: No lymphadenopathy  Cardiovascular: Normal S1 S2, No JVD, No murmurs, No edema  Respiratory: Lungs clear to auscultation	  Psychiatry: A & O x 3, Mood & affect appropriate  Gastrointestinal:  Soft, Non-tender, + BS	  Skin: No rashes, No ecchymoses, No cyanosis	  Neurologic: Non-focal  Extremities: Normal range of motion, No clubbing, cyanosis or edema  Vascular: Peripheral pulses palpable 2+ bilaterally    TELEMETRY: 	    ECG:  	  RADIOLOGY:  OTHER: 	  	  LABS:	 	    CARDIAC MARKERS:                              9.6    13.29 )-----------( 190      ( 25 Jan 2022 17:07 )             30.6     01-26    136  |  103  |  26<H>  ----------------------------<  95  4.1   |  23  |  0.58    Ca    9.2      26 Jan 2022 07:09    TPro  6.3  /  Alb  3.7  /  TBili  0.6  /  DBili  x   /  AST  20  /  ALT  33  /  AlkPhos  69  01-26    proBNP:   Lipid Profile:   HgA1c:   TSH:       PREVIOUS DIAGNOSTIC TESTING:    < from: CT Head No Cont (01.25.22 @ 16:32) >  IMPRESSION: Lytic lesion involving the calvarium as described above.    Degenerative changes involving the cervical spine.      < from: Xray Chest 1 View- PORTABLE-Urgent (Xray Chest 1 View- PORTABLE-Urgent .) (01.25.22 @ 16:35) >  Faint left mid lower lung opacity which may represent developing   pneumonia.

## 2022-01-26 NOTE — OCCUPATIONAL THERAPY INITIAL EVALUATION ADULT - THERAPY FREQUENCY, OT EVAL
1-2x/week Posterior Auricular Interpolation Flap Text: A decision was made to reconstruct the defect utilizing an interpolation axial flap and a staged reconstruction.  A telfa template was made of the defect.  This telfa template was then used to outline the posterior auricular interpolation flap.  The donor area for the pedicle flap was then injected with anesthesia.  The flap was excised through the skin and subcutaneous tissue down to the layer of the underlying musculature.  The pedicle flap was carefully excised within this deep plane to maintain its blood supply.  The edges of the donor site were undermined.   The donor site was closed in a primary fashion.  The pedicle was then rotated into position and sutured.  Once the tube was sutured into place, adequate blood supply was confirmed with blanching and refill.  The pedicle was then wrapped with xeroform gauze and dressed appropriately with a telfa and gauze bandage to ensure continued blood supply and protect the attached pedicle.

## 2022-01-26 NOTE — PROGRESS NOTE ADULT - ASSESSMENT
A/P: 82yo Male     h/o  achalasia s/p PEG (planned for removal in Feb),    now tolerating regular diet,    presents s/p mechanical fall found to have L humerus fracture.   - fall precautions  - pain control with prn oxycodone.    * : Lytic lesion of  calvarium    r/o myeloma   expansile lytic lesion seen involving the high left frontal calvarium on CTH  oncology dr dennis\  cxr,  opacity/  ct chest  on dvt ppx       ra< from: CT Head No Cont (01.25.22 @ 16:32) >  IMPRESSION: Lytic lesion involving the calvarium as described above.  Degenerative changes involving the cervical spine.  < end of copied text >       rad< from: Xray Chest 1 View- PORTABLE-Urgent (Xray Chest 1 View- PORTABLE-Urgent .) (01.25.22 @ 16:35) >  IMPRESSION:  Faint left mid lower lung opacity which may represent developing   pneumonia.  < end of copied text >    *

## 2022-01-26 NOTE — PHYSICAL THERAPY INITIAL EVALUATION ADULT - TRANSFER TRAINING, PT EVAL
GOAL: Pt will perform sit to/from stand transfers independently with/without AD as needed within 2-4 weeks.

## 2022-01-26 NOTE — CONSULT NOTE ADULT - ASSESSMENT
81M with PMH of achalasia s/p PEG (planned for removal in Feb), now tolerating regular diet, presents s/p mechanical fall. Patient went to get his mail when he slipped and fell on his L side of the body. He hit his head however denies any loss of consciousness. No lightheadedness, dizziness, chest pain, palpitations, or shortness of breath prior to the fall. After the fall, patient started to have L shoulder pain. Patient walks with a walker. Patient denies any fevers or chills. No cough, shortness of breath, nausea, vomiting, abdominal pain, diarrhea, or dysuria. No melena or hematochezia.   In the ED, T is 98.5, HR 77, /83, RR 16 satting 99% on room air. Patient received tylenol 650mg X1.  pt with hx of htn, with s/p fall  check orthostatic  will hold bp meds  onc eval, for bone lesions  awaiting ecg  dvt prophylaxis

## 2022-01-26 NOTE — CONSULT NOTE ADULT - SUBJECTIVE AND OBJECTIVE BOX
HPI:  81M with PMH of achalasia s/p PEG (planned for removal in Feb), now tolerating regular diet, presents s/p mechanical fall. Patient went to get his mail when he slipped and fell on his L side of the body. He hit his head however denies any loss of consciousness. No lightheadedness, dizziness, chest pain, palpitations, or shortness of breath prior to the fall. After the fall, patient started to have L shoulder pain. Patient walks with a walker. Patient denies any fevers or chills. No cough, shortness of breath, nausea, vomiting, abdominal pain, diarrhea, or dysuria. No melena or hematochezia.     In the ED, T is 98.5, HR 77, /83, RR 16 satting 99% on room air. Patient received tylenol 650mg X1.  (25 Jan 2022 20:59)    PAST MEDICAL & SURGICAL HISTORY:  Achalasia    PEG (percutaneous endoscopic gastrostomy) status        Allergies:    Family history:  Social history:  Meds:  acetaminophen     Tablet .. 650 milliGRAM(s) Oral every 6 hours PRN Mild Pain (1 - 3), Moderate Pain (4 - 6)  enoxaparin Injectable 40 milliGRAM(s) SubCutaneous daily  FLUoxetine 20 milliGRAM(s) Oral daily  melatonin 3 milliGRAM(s) Oral at bedtime  oxyCODONE    IR 2.5 milliGRAM(s) Oral every 6 hours PRN Severe Pain (7 - 10)  polyethylene glycol 3350 17 Gram(s) Oral daily    Labs:  CBC Full  -  ( 26 Jan 2022 08:36 )  WBC Count : 7.23 K/uL  Hemoglobin : 9.2 g/dL  Hematocrit : 29.4 %  Platelet Count - Automated : 172 K/uL  Mean Cell Volume : 68.4 fl  Mean Cell Hemoglobin : 21.4 pg  Mean Cell Hemoglobin Concentration : 31.3 gm/dL  Auto Neutrophil # : x  Auto Lymphocyte # : x  Auto Monocyte # : x  Auto Eosinophil # : x  Auto Basophil # : x  Auto Neutrophil % : x  Auto Lymphocyte % : x  Auto Monocyte % : x  Auto Eosinophil % : x  Auto Basophil % : x    01-26    136  |  103  |  26<H>  ----------------------------<  95  4.1   |  23  |  0.58    Ca    9.2      26 Jan 2022 07:09    TPro  6.3  /  Alb  3.7  /  TBili  0.6  /  DBili  x   /  AST  20  /  ALT  33  /  AlkPhos  69  01-26      Radiology:     < from: Xray Humerus, Left (01.25.22 @ 16:37) >  IMPRESSION:  Acute left comminuted proximal humerus fracture at the level of the   surgical neck with mildly displaced butterfly fragment projecting   medially.      < end of copied text >  < from: CT Head No Cont (01.25.22 @ 16:32) >  NTERPRETATION:  Clinical indication: Fall. Headache.    Multiple axial sections were performed from base skull to vertex without   contrast enhancement.    Axial sections were performed through the cervical spine. Coronal and   sagittal reconstructions were performed as well    Parenchymal volume loss and chronic microvascular ischemic changes are   identified    There is no acute hemorrhage mass mass effect seen    There is evidence of an expansile lytic lesion seen involving the high   left frontal calvarium. This could be compatible with underlying   metastasis, multiple myeloma, lymphoma or hemangioma or other similar   lesions cannot be entirely excluded. Contrast enhanced MRI of the brain   can be done to better characterize this finding.    Cervical spine:    Loss of the normal cervical lordosis seen    C2 is slightly anteriorly displaced relative C3 and C3 is slightly   anteriorly displaced relative C4. These findings likely the result of   chronic degenerative change    Fusion is seen involving the posterior elements of C2, C3 and C4 which is   likely result of degenerative changes.    Disc space narrowing templates for change in mass effect seen involving   the cervical spine is seen which is secondary to chronic degenerative   change    Bilateral hypertrophic facet changes are seen, these findings are   secondary tochronic degenerative changes.    Evaluation of the paraspinal soft tissues limited by lack of IV contrast   though grossly unremarkable.    The visualized portions of both lung apices appear clear.    IMPRESSION: Lytic lesion involving the calvarium as described above.    Degenerative changes involving the cervical spine.      < end of copied text >          ROS:  No pain, no fever  No lumps in neck or pain  No Sob or chest pain  No palpitations   No abdominal pain. No change in bowel habit. No blood in stools  No weakness in extremities  No leg swelling      Vital Signs Last 24 Hrs  T(C): 36.7 (26 Jan 2022 04:55), Max: 37.6 (25 Jan 2022 19:40)  T(F): 98.1 (26 Jan 2022 04:55), Max: 99.7 (25 Jan 2022 19:40)  HR: 63 (26 Jan 2022 04:55) (63 - 82)  BP: 142/83 (26 Jan 2022 04:55) (117/82 - 146/83)  BP(mean): --  RR: 18 (26 Jan 2022 04:55) (16 - 18)  SpO2: 95% (26 Jan 2022 04:55) (95% - 99%)    Physical Exam:  Patient comfortable  AXOX3  Neck supple, no LN's  Chest: bilateral breath sounds, no wheeze or rales  CVS: regular heart rate without murmur  Abdomen: soft, BS+, no massess or organomegaly  CNS: no gross deficit  Ext: no edema       HPI:  81M with PMH of achalasia s/p PEG (planned for removal in Feb 2022), now tolerating regular diet, admitted 1/25/22 s/p mechanical fall. Patient went to get his mail when he slipped and fell on his L side of the body. He hit his head however denies any loss of consciousness. No lightheadedness, dizziness, chest pain, palpitations, or shortness of breath prior to the fall. After the fall, patient started to have L shoulder pain. Patient walks with a walker. Patient denies any fevers or chills. No cough, shortness of breath, nausea, vomiting, abdominal pain, diarrhea, or dysuria. No melena or hematochezia.   Seen for lytic lesion in skull and anemia    PAST MEDICAL & SURGICAL HISTORY:  Achalasia    PEG (percutaneous endoscopic gastrostomy) status        Allergies: NKA    Family history: non contributory  Social history: No smoking or ETOH  Meds:  acetaminophen     Tablet .. 650 milliGRAM(s) Oral every 6 hours PRN Mild Pain (1 - 3), Moderate Pain (4 - 6)  enoxaparin Injectable 40 milliGRAM(s) SubCutaneous daily  FLUoxetine 20 milliGRAM(s) Oral daily  melatonin 3 milliGRAM(s) Oral at bedtime  oxyCODONE    IR 2.5 milliGRAM(s) Oral every 6 hours PRN Severe Pain (7 - 10)  polyethylene glycol 3350 17 Gram(s) Oral daily    Labs:  CBC Full  -  ( 26 Jan 2022 08:36 )  WBC Count : 7.23 K/uL  Hemoglobin : 9.2 g/dL  Hematocrit : 29.4 %  Platelet Count - Automated : 172 K/uL  Mean Cell Volume : 68.4 fl  Mean Cell Hemoglobin : 21.4 pg  Mean Cell Hemoglobin Concentration : 31.3 gm/dL  Auto Neutrophil # : x  Auto Lymphocyte # : x  Auto Monocyte # : x  Auto Eosinophil # : x  Auto Basophil # : x  Auto Neutrophil % : x  Auto Lymphocyte % : x  Auto Monocyte % : x  Auto Eosinophil % : x  Auto Basophil % : x    01-26    136  |  103  |  26<H>  ----------------------------<  95  4.1   |  23  |  0.58    Ca    9.2      26 Jan 2022 07:09    TPro  6.3  /  Alb  3.7  /  TBili  0.6  /  DBili  x   /  AST  20  /  ALT  33  /  AlkPhos  69  01-26      Radiology:     < from: Xray Humerus, Left (01.25.22 @ 16:37) >  IMPRESSION:  Acute left comminuted proximal humerus fracture at the level of the   surgical neck with mildly displaced butterfly fragment projecting   medially.      < end of copied text >  < from: CT Head No Cont (01.25.22 @ 16:32) >  NTERPRETATION:  Clinical indication: Fall. Headache.    Multiple axial sections were performed from base skull to vertex without   contrast enhancement.    Axial sections were performed through the cervical spine. Coronal and   sagittal reconstructions were performed as well    Parenchymal volume loss and chronic microvascular ischemic changes are   identified    There is no acute hemorrhage mass mass effect seen    There is evidence of an expansile lytic lesion seen involving the high   left frontal calvarium. This could be compatible with underlying   metastasis, multiple myeloma, lymphoma or hemangioma or other similar   lesions cannot be entirely excluded. Contrast enhanced MRI of the brain   can be done to better characterize this finding.    Cervical spine:    Loss of the normal cervical lordosis seen    C2 is slightly anteriorly displaced relative C3 and C3 is slightly   anteriorly displaced relative C4. These findings likely the result of   chronic degenerative change    Fusion is seen involving the posterior elements of C2, C3 and C4 which is   likely result of degenerative changes.    Disc space narrowing templates for change in mass effect seen involving   the cervical spine is seen which is secondary to chronic degenerative   change    Bilateral hypertrophic facet changes are seen, these findings are   secondary tochronic degenerative changes.    Evaluation of the paraspinal soft tissues limited by lack of IV contrast   though grossly unremarkable.    The visualized portions of both lung apices appear clear.    IMPRESSION: Lytic lesion involving the calvarium as described above.    Degenerative changes involving the cervical spine.      < end of copied text >          ROS:  No pain, no fever  No lumps in neck or pain  No Sob or chest pain  No palpitations   No abdominal pain. No change in bowel habit. No blood in stools  No weakness in extremities  No leg swelling      Vital Signs Last 24 Hrs  T(C): 36.7 (26 Jan 2022 04:55), Max: 37.6 (25 Jan 2022 19:40)  T(F): 98.1 (26 Jan 2022 04:55), Max: 99.7 (25 Jan 2022 19:40)  HR: 63 (26 Jan 2022 04:55) (63 - 82)  BP: 142/83 (26 Jan 2022 04:55) (117/82 - 146/83)  BP(mean): --  RR: 18 (26 Jan 2022 04:55) (16 - 18)  SpO2: 95% (26 Jan 2022 04:55) (95% - 99%)    Physical Exam:  Patient comfortable  AXOX3  Neck supple, no LN's  Chest: bilateral breath sounds, no wheeze or rales  CVS: regular heart rate without murmur  Abdomen: soft, BS+, no massess or organomegaly  CNS: no gross deficit  Ext: no edema       HPI:  81M with PMH of achalasia s/p PEG (planned for removal in Feb 2022), now tolerating regular diet, admitted 1/25/22 s/p mechanical fall. Patient went to get his mail when he slipped and fell on his L side of the body. He hit his head however denies any loss of consciousness. No lightheadedness, dizziness, chest pain, palpitations, or shortness of breath prior to the fall. After the fall, patient started to have L shoulder pain. Patient walks with a walker. Patient denies any fevers or chills. No cough, shortness of breath, nausea, vomiting, abdominal pain, diarrhea, or dysuria. No melena or hematochezia.   Seen for lytic lesion in skull and anemia    PAST MEDICAL & SURGICAL HISTORY:  Achalasia    PEG (percutaneous endoscopic gastrostomy) status        Allergies: NKA    Family history: non contributory  Social history: No smoking or ETOH  Meds:  acetaminophen     Tablet .. 650 milliGRAM(s) Oral every 6 hours PRN Mild Pain (1 - 3), Moderate Pain (4 - 6)  enoxaparin Injectable 40 milliGRAM(s) SubCutaneous daily  FLUoxetine 20 milliGRAM(s) Oral daily  melatonin 3 milliGRAM(s) Oral at bedtime  oxyCODONE    IR 2.5 milliGRAM(s) Oral every 6 hours PRN Severe Pain (7 - 10)  polyethylene glycol 3350 17 Gram(s) Oral daily    Labs:  CBC Full  -  ( 26 Jan 2022 08:36 )  WBC Count : 7.23 K/uL  Hemoglobin : 9.2 g/dL  Hematocrit : 29.4 %  Platelet Count - Automated : 172 K/uL  Mean Cell Volume : 68.4 fl  Mean Cell Hemoglobin : 21.4 pg  Mean Cell Hemoglobin Concentration : 31.3 gm/dL  Auto Neutrophil # : x  Auto Lymphocyte # : x  Auto Monocyte # : x  Auto Eosinophil # : x  Auto Basophil # : x  Auto Neutrophil % : x  Auto Lymphocyte % : x  Auto Monocyte % : x  Auto Eosinophil % : x  Auto Basophil % : x    01-26    136  |  103  |  26<H>  ----------------------------<  95  4.1   |  23  |  0.58    Ca    9.2      26 Jan 2022 07:09    TPro  6.3  /  Alb  3.7  /  TBili  0.6  /  DBili  x   /  AST  20  /  ALT  33  /  AlkPhos  69  01-26      Radiology:     < from: Xray Humerus, Left (01.25.22 @ 16:37) >  IMPRESSION:  Acute left comminuted proximal humerus fracture at the level of the   surgical neck with mildly displaced butterfly fragment projecting   medially.      < end of copied text >  < from: CT Head No Cont (01.25.22 @ 16:32) >  NTERPRETATION:  Clinical indication: Fall. Headache.    Multiple axial sections were performed from base skull to vertex without   contrast enhancement.    Axial sections were performed through the cervical spine. Coronal and   sagittal reconstructions were performed as well    Parenchymal volume loss and chronic microvascular ischemic changes are   identified    There is no acute hemorrhage mass mass effect seen    There is evidence of an expansile lytic lesion seen involving the high   left frontal calvarium. This could be compatible with underlying   metastasis, multiple myeloma, lymphoma or hemangioma or other similar   lesions cannot be entirely excluded. Contrast enhanced MRI of the brain   can be done to better characterize this finding.    Cervical spine:    Loss of the normal cervical lordosis seen    C2 is slightly anteriorly displaced relative C3 and C3 is slightly   anteriorly displaced relative C4. These findings likely the result of   chronic degenerative change    Fusion is seen involving the posterior elements of C2, C3 and C4 which is   likely result of degenerative changes.    Disc space narrowing templates for change in mass effect seen involving   the cervical spine is seen which is secondary to chronic degenerative   change    Bilateral hypertrophic facet changes are seen, these findings are   secondary tochronic degenerative changes.    Evaluation of the paraspinal soft tissues limited by lack of IV contrast   though grossly unremarkable.    The visualized portions of both lung apices appear clear.    IMPRESSION: Lytic lesion involving the calvarium as described above.    Degenerative changes involving the cervical spine.      < end of copied text >          ROS:  Left arm in sling  No lumps in neck or pain  No Sob or chest pain  No palpitations   See HPI, has been able to take oral meals  No weakness in extremities  No leg swelling      Vital Signs Last 24 Hrs  T(C): 36.7 (26 Jan 2022 04:55), Max: 37.6 (25 Jan 2022 19:40)  T(F): 98.1 (26 Jan 2022 04:55), Max: 99.7 (25 Jan 2022 19:40)  HR: 63 (26 Jan 2022 04:55) (63 - 82)  BP: 142/83 (26 Jan 2022 04:55) (117/82 - 146/83)  BP(mean): --  RR: 18 (26 Jan 2022 04:55) (16 - 18)  SpO2: 95% (26 Jan 2022 04:55) (95% - 99%)    Physical Exam:  Patient comfortable  AXOX3  Neck supple, no LN's  Chest: bilateral breath sounds, no wheeze or rales  CVS: S1S2  Abdomen: soft, BS+, no massess or organomegaly  CNS: no gross deficit  Musculoskeletal: left arm in sling   no edema

## 2022-01-26 NOTE — PHYSICAL THERAPY INITIAL EVALUATION ADULT - BALANCE TRAINING, PT EVAL
GOAL: Pt will improve balance during (static/dynamic) (sitting/standing) activities by at least 1 balance grade within 2-4 weeks to assist with greater independence during functional mobility and ADL's.

## 2022-01-27 LAB
ALBUMIN SERPL ELPH-MCNC: 3.6 G/DL — SIGNIFICANT CHANGE UP (ref 3.3–5)
ALP SERPL-CCNC: 68 U/L — SIGNIFICANT CHANGE UP (ref 40–120)
ALT FLD-CCNC: 31 U/L — SIGNIFICANT CHANGE UP (ref 10–45)
ANION GAP SERPL CALC-SCNC: 11 MMOL/L — SIGNIFICANT CHANGE UP (ref 5–17)
AST SERPL-CCNC: 17 U/L — SIGNIFICANT CHANGE UP (ref 10–40)
BASOPHILS # BLD AUTO: 0.07 K/UL — SIGNIFICANT CHANGE UP (ref 0–0.2)
BASOPHILS NFR BLD AUTO: 1 % — SIGNIFICANT CHANGE UP (ref 0–2)
BILIRUB SERPL-MCNC: 0.6 MG/DL — SIGNIFICANT CHANGE UP (ref 0.2–1.2)
BUN SERPL-MCNC: 35 MG/DL — HIGH (ref 7–23)
CALCIUM SERPL-MCNC: 9 MG/DL — SIGNIFICANT CHANGE UP (ref 8.4–10.5)
CHLORIDE SERPL-SCNC: 105 MMOL/L — SIGNIFICANT CHANGE UP (ref 96–108)
CO2 SERPL-SCNC: 24 MMOL/L — SIGNIFICANT CHANGE UP (ref 22–31)
CREAT SERPL-MCNC: 0.6 MG/DL — SIGNIFICANT CHANGE UP (ref 0.5–1.3)
EOSINOPHIL # BLD AUTO: 0.32 K/UL — SIGNIFICANT CHANGE UP (ref 0–0.5)
EOSINOPHIL NFR BLD AUTO: 4.6 % — SIGNIFICANT CHANGE UP (ref 0–6)
GLUCOSE SERPL-MCNC: 102 MG/DL — HIGH (ref 70–99)
HCT VFR BLD CALC: 26.6 % — LOW (ref 39–50)
HGB BLD-MCNC: 8.4 G/DL — LOW (ref 13–17)
IMM GRANULOCYTES NFR BLD AUTO: 0.3 % — SIGNIFICANT CHANGE UP (ref 0–1.5)
LYMPHOCYTES # BLD AUTO: 1.12 K/UL — SIGNIFICANT CHANGE UP (ref 1–3.3)
LYMPHOCYTES # BLD AUTO: 15.9 % — SIGNIFICANT CHANGE UP (ref 13–44)
MCHC RBC-ENTMCNC: 21.4 PG — LOW (ref 27–34)
MCHC RBC-ENTMCNC: 31.6 GM/DL — LOW (ref 32–36)
MCV RBC AUTO: 67.9 FL — LOW (ref 80–100)
MONOCYTES # BLD AUTO: 0.77 K/UL — SIGNIFICANT CHANGE UP (ref 0–0.9)
MONOCYTES NFR BLD AUTO: 11 % — SIGNIFICANT CHANGE UP (ref 2–14)
NEUTROPHILS # BLD AUTO: 4.73 K/UL — SIGNIFICANT CHANGE UP (ref 1.8–7.4)
NEUTROPHILS NFR BLD AUTO: 67.2 % — SIGNIFICANT CHANGE UP (ref 43–77)
NRBC # BLD: 0 /100 WBCS — SIGNIFICANT CHANGE UP (ref 0–0)
PLATELET # BLD AUTO: 159 K/UL — SIGNIFICANT CHANGE UP (ref 150–400)
POTASSIUM SERPL-MCNC: 4.2 MMOL/L — SIGNIFICANT CHANGE UP (ref 3.5–5.3)
POTASSIUM SERPL-SCNC: 4.2 MMOL/L — SIGNIFICANT CHANGE UP (ref 3.5–5.3)
PROT SERPL-MCNC: 6.1 G/DL — SIGNIFICANT CHANGE UP (ref 6–8.3)
RBC # BLD: 3.92 M/UL — LOW (ref 4.2–5.8)
RBC # FLD: 15.7 % — HIGH (ref 10.3–14.5)
SODIUM SERPL-SCNC: 140 MMOL/L — SIGNIFICANT CHANGE UP (ref 135–145)
WBC # BLD: 7.03 K/UL — SIGNIFICANT CHANGE UP (ref 3.8–10.5)
WBC # FLD AUTO: 7.03 K/UL — SIGNIFICANT CHANGE UP (ref 3.8–10.5)

## 2022-01-27 RX ORDER — SODIUM CHLORIDE 0.65 %
1 AEROSOL, SPRAY (ML) NASAL
Refills: 0 | Status: DISCONTINUED | OUTPATIENT
Start: 2022-01-27 | End: 2022-01-27

## 2022-01-27 RX ORDER — INFLUENZA VIRUS VACCINE 15; 15; 15; 15 UG/.5ML; UG/.5ML; UG/.5ML; UG/.5ML
0.7 SUSPENSION INTRAMUSCULAR ONCE
Refills: 0 | Status: DISCONTINUED | OUTPATIENT
Start: 2022-01-27 | End: 2022-01-31

## 2022-01-27 RX ORDER — SODIUM CHLORIDE 9 MG/ML
1000 INJECTION INTRAMUSCULAR; INTRAVENOUS; SUBCUTANEOUS
Refills: 0 | Status: DISCONTINUED | OUTPATIENT
Start: 2022-01-27 | End: 2022-01-28

## 2022-01-27 RX ADMIN — POLYETHYLENE GLYCOL 3350 17 GRAM(S): 17 POWDER, FOR SOLUTION ORAL at 12:35

## 2022-01-27 RX ADMIN — Medication 20 MILLIGRAM(S): at 12:35

## 2022-01-27 RX ADMIN — ENOXAPARIN SODIUM 40 MILLIGRAM(S): 100 INJECTION SUBCUTANEOUS at 12:35

## 2022-01-27 RX ADMIN — SODIUM CHLORIDE 70 MILLILITER(S): 9 INJECTION INTRAMUSCULAR; INTRAVENOUS; SUBCUTANEOUS at 08:47

## 2022-01-27 RX ADMIN — SODIUM CHLORIDE 70 MILLILITER(S): 9 INJECTION INTRAMUSCULAR; INTRAVENOUS; SUBCUTANEOUS at 20:25

## 2022-01-27 RX ADMIN — Medication 1 SPRAY(S): at 05:56

## 2022-01-27 RX ADMIN — Medication 3 MILLIGRAM(S): at 21:10

## 2022-01-27 NOTE — PROGRESS NOTE ADULT - SUBJECTIVE AND OBJECTIVE BOX
afberile  REVIEW OF SYSTEMS:  GEN: no fever,    no chills  RESP: no SOB,   no cough  CVS: no chest pain,   no palpitations  GI: no abdominal pain,   no nausea,   no vomiting,   no constipation,   no diarrhea  : no dysuria,   no frequency  NEURO: no headache,   no dizziness  PSYCH: no depression,   not anxious  Derm : no rash    MEDICATIONS  (STANDING):  enoxaparin Injectable 40 milliGRAM(s) SubCutaneous daily  FLUoxetine 20 milliGRAM(s) Oral daily  melatonin 3 milliGRAM(s) Oral at bedtime  polyethylene glycol 3350 17 Gram(s) Oral daily    MEDICATIONS  (PRN):  acetaminophen     Tablet .. 650 milliGRAM(s) Oral every 6 hours PRN Mild Pain (1 - 3), Moderate Pain (4 - 6)  oxyCODONE    IR 2.5 milliGRAM(s) Oral every 6 hours PRN Severe Pain (7 - 10)  sodium chloride 0.65% Nasal 1 Spray(s) Both Nostrils two times a day PRN Nasal Congestion      Vital Signs Last 24 Hrs  T(C): 36.9 (27 Jan 2022 04:46), Max: 36.9 (27 Jan 2022 04:46)  T(F): 98.4 (27 Jan 2022 04:46), Max: 98.4 (27 Jan 2022 04:46)  HR: 67 (27 Jan 2022 04:46) (64 - 67)  BP: 111/74 (27 Jan 2022 04:46) (108/68 - 114/74)  BP(mean): --  RR: 18 (27 Jan 2022 04:46) (18 - 18)  SpO2: 97% (27 Jan 2022 04:46) (97% - 100%)  CAPILLARY BLOOD GLUCOSE      POCT Blood Glucose.: 136 mg/dL (26 Jan 2022 13:02)  POCT Blood Glucose.: 113 mg/dL (26 Jan 2022 08:55)    I&O's Summary      PHYSICAL EXAM:  HEAD:  Atraumatic, Normocephalic  NECK: Supple, No   JVD  CHEST/LUNG:   no     rales,     no,    rhonchi  HEART: Regular rate and rhythm;         murmur  ABDOMEN: Soft, Nontender, ;   EXTREMITIES:    no    edema  NEUROLOGY:  alert    LABS:                        9.2    7.23  )-----------( 172      ( 26 Jan 2022 08:36 )             29.4     01-26    136  |  103  |  26<H>  ----------------------------<  95  4.1   |  23  |  0.58    Ca    9.2      26 Jan 2022 07:09    TPro  6.3  /  Alb  3.7  /  TBili  0.6  /  DBili  x   /  AST  20  /  ALT  33  /  AlkPhos  69  01-26                            Consultant(s) Notes Reviewed:      Care Discussed with Consultants/Other Providers:

## 2022-01-27 NOTE — PROGRESS NOTE ADULT - SUBJECTIVE AND OBJECTIVE BOX
[facility]  Allan Dale PA-C  Pending sale to [facility] [place] [address]  [phone]      INTERVAL HPI/OVERNIGHT EVENTS:  pt seen and examined, no new events  tolerating PO diet     MEDICATIONS  (STANDING):  enoxaparin Injectable 40 milliGRAM(s) SubCutaneous daily  FLUoxetine 20 milliGRAM(s) Oral daily  melatonin 3 milliGRAM(s) Oral at bedtime  polyethylene glycol 3350 17 Gram(s) Oral daily  sodium chloride 0.9%. 1000 milliLiter(s) (70 mL/Hr) IV Continuous <Continuous>    MEDICATIONS  (PRN):  acetaminophen     Tablet .. 650 milliGRAM(s) Oral every 6 hours PRN Mild Pain (1 - 3), Moderate Pain (4 - 6)  oxyCODONE    IR 2.5 milliGRAM(s) Oral every 6 hours PRN Severe Pain (7 - 10)      Allergies    No Known Allergies    Intolerances        ROS:   General:  No wt loss, fevers, chills, night sweats, fatigue,   Eyes:  Good vision, no reported pain  ENT:  No sore throat, pain, runny nose, dysphagia  CV:  No pain, palpitations, hypo/hypertension  Resp:  No dyspnea, cough, tachypnea, wheezing  GI:  No pain, No nausea, No vomiting, No diarrhea, No constipation, No weight loss, No fever, No pruritis, No rectal bleeding, No tarry stools, No dysphagia,  :  No pain, bleeding, incontinence, nocturia  Muscle:  + pain, weakness  Neuro:  No weakness, tingling, memory problems  Psych:  No fatigue, insomnia, mood problems, depression  Endocrine:  No polyuria, polydipsia, cold/heat intolerance  Heme:  No petechiae, ecchymosis, easy bruisability  Skin:  No rash, tattoos, scars, edema      PHYSICAL EXAM:   Vital Signs:  Vital Signs Last 24 Hrs  T(C): 36.9 (27 Jan 2022 09:00), Max: 36.9 (27 Jan 2022 04:46)  T(F): 98.5 (27 Jan 2022 09:00), Max: 98.5 (27 Jan 2022 09:00)  HR: 88 (27 Jan 2022 09:00) (67 - 88)  BP: 112/70 (27 Jan 2022 09:00) (111/74 - 114/74)  BP(mean): --  RR: 18 (27 Jan 2022 09:00) (18 - 18)  SpO2: 98% (27 Jan 2022 09:00) (97% - 100%)  Daily     Daily     GENERAL:  Appears stated age,   HEENT:  NC/AT,    CHEST:  Full & symmetric excursion,   HEART:  Regular rhythm,  ABDOMEN:  Soft, non-tender, non-distended,  EXTEREMITIES:  no cyanosis  SKIN:  No rash  NEURO:  Alert,       LABS:                        8.4    7.03  )-----------( 159      ( 27 Jan 2022 07:01 )             26.6     01-27    140  |  105  |  35<H>  ----------------------------<  102<H>  4.2   |  24  |  0.60    Ca    9.0      27 Jan 2022 07:01    TPro  6.1  /  Alb  3.6  /  TBili  0.6  /  DBili  x   /  AST  17  /  ALT  31  /  AlkPhos  68  01-27          RADIOLOGY & ADDITIONAL TESTS:

## 2022-01-27 NOTE — PROGRESS NOTE ADULT - ASSESSMENT
A/P: 80yo Male     h/o  achalasia s/p PEG (planned for removal in Feb),    now tolerating regular diet,    presents s/p mechanical fall found to have L humerus fracture.   - fall precautions  - pain control with prn oxycodone.    * : Lytic lesion of  calvarium    r/o myeloma   expansile lytic lesion seen involving the high left frontal calvarium on CTH  oncology  ohri\  cxr,  opacity/  ct chest,  minimal fluid  w/p  per  oncology    * on dvt ppx  * pt  is  orthostatic, on iv  fluids    daily orthostatics       ra< from: CT Head No Cont (01.25.22 @ 16:32) >  IMPRESSION: Lytic lesion involving the calvarium as described above.  Degenerative changes involving the cervical spine.  < end of copied text >       rad< from: Xray Chest 1 View- PORTABLE-Urgent (Xray Chest 1 View- PORTABLE-Urgent .) (01.25.22 @ 16:35) >  IMPRESSION:  Faint left mid lower lung opacity which may represent developing   pneumonia.  < end of copied text >    *

## 2022-01-27 NOTE — PROGRESS NOTE ADULT - ASSESSMENT
humeral fracture  achalasia    patient no longer uses peg  achalasia was treated with oliva (dr. rooney)   scheduled for peg removal next month  can offer to repeat as inpatient tomorrow   patient agrees  cont diet as tolerated  npo after midnight   will follow

## 2022-01-27 NOTE — PROGRESS NOTE ADULT - SUBJECTIVE AND OBJECTIVE BOX
81M with PMH of achalasia s/p PEG (planned for removal in 2022), now tolerating regular diet, admitted 22 s/p mechanical fall. Patient went to get his mail when he slipped and fell on his L side of the body. He hit his head however denies any loss of consciousness. No lightheadedness, dizziness, chest pain, palpitations, or shortness of breath prior to the fall. After the fall, patient started to have L shoulder pain. Patient walks with a walker. Patient denies any fevers or chills. No cough, shortness of breath, nausea, vomiting, abdominal pain, diarrhea, or dysuria. No melena or hematochezia.   Seen for lytic lesion in skull and anemia  PAST MEDICAL & SURGICAL HISTORY:  Ganglion cyst of Left wrist    Eczema    Achalasia    H/O repair of left rotator cuff  RIGHT SHOULDER    PEG (percutaneous endoscopic gastrostomy) status      Medications:  acetaminophen     Tablet .. 650 milliGRAM(s) Oral every 6 hours PRN Mild Pain (1 - 3), Moderate Pain (4 - 6)  enoxaparin Injectable 40 milliGRAM(s) SubCutaneous daily  FLUoxetine 20 milliGRAM(s) Oral daily  influenza  Vaccine (HIGH DOSE) 0.7 milliLiter(s) IntraMuscular once  melatonin 3 milliGRAM(s) Oral at bedtime  oxyCODONE    IR 2.5 milliGRAM(s) Oral every 6 hours PRN Severe Pain (7 - 10)  polyethylene glycol 3350 17 Gram(s) Oral daily  sodium chloride 0.9%. 1000 milliLiter(s) IV Continuous <Continuous>    Labs:  CBC Full  -  ( 2022 07:01 )  WBC Count : 7.03 K/uL  Hemoglobin : 8.4 g/dL  Hematocrit : 26.6 %  Platelet Count - Automated : 159 K/uL  Mean Cell Volume : 67.9 fl  Mean Cell Hemoglobin : 21.4 pg  Mean Cell Hemoglobin Concentration : 31.6 gm/dL  Auto Neutrophil # : 4.73 K/uL  Auto Lymphocyte # : 1.12 K/uL  Auto Monocyte # : 0.77 K/uL  Auto Eosinophil # : 0.32 K/uL  Auto Basophil # : 0.07 K/uL  Auto Neutrophil % : 67.2 %  Auto Lymphocyte % : 15.9 %  Auto Monocyte % : 11.0 %  Auto Eosinophil % : 4.6 %  Auto Basophil % : 1.0 %        140  |  105  |  35<H>  ----------------------------<  102<H>  4.2   |  24  |  0.60    Ca    9.0      2022 07:01    TPro  6.1  /  Alb  3.6  /  TBili  0.6  /  DBili  x   /  AST  17  /  ALT  31  /  AlkPhos  68    Haptoglobin, Serum: 79 mg/dL   Iron with Total Binding Capacity (22 @ 16:53)   Iron - Total Binding Capacity.: 242 ug/dL   % Saturation, Iron: 15 %   Iron Total, Serum: 36 ug/dL   Unsaturated Iron Binding Capacity: 206 ug/dL   Ferritin, Serum (22 @ 16:21)   Ferritin, Serum: 446 ng/mL   Vitamin B12, Serum: 567 pg/mL (22 @ 16:21)   Immunoglobulins Panel (22 @ 15:51)   Quantitative Ig mg/dL   Quantitative IgA: 246 mg/dL   Quantitative IgM: 107 mg/dL   MARISSA Kappa: 3.39 mg/dL   MARISSA Lambda: 1.93 mg/dL   Garey/Lambda Free Light Chain Ratio, Serum: 1.76 Ratio       Radiology:             ROS:  Patient comfortable without distress  No SOB or chest pain  No palpitation  No abdominal pain, diarrhaea or constipation  No weakness of extremities  No skin changes or swelling of legs    Vital Signs Last 24 Hrs  T(C): 36.8 (2022 13:38), Max: 36.9 (2022 04:46)  T(F): 98.2 (2022 13:38), Max: 98.5 (2022 09:00)  HR: 76 (2022 13:38) (67 - 88)  BP: 115/75 (2022 13:38) (111/74 - 115/75)  BP(mean): --  RR: 18 (2022 13:38) (18 - 18)  SpO2: 98% (2022 13:38) (97% - 98%)    Physical exam:  Patient alert and oriented  No distress  CVS: S1, S2   Chest: bilateral breath sound without rales  Abdomen: soft, not tender, no organomegaly or masses, PEG  No focal neuro deficit  No edema, arm sling      Assessment and Plan:

## 2022-01-27 NOTE — PROGRESS NOTE ADULT - ASSESSMENT
81M with PMH of achalasia s/p PEG (planned for removal in Feb 2022), now tolerating regular diet, admitted 1/25/22 s/p mechanical fall. Patient went to get his mail when he slipped and fell on his L side of the body. He hit his head however denies any loss of consciousness. No lightheadedness, dizziness, chest pain, palpitations, or shortness of breath prior to the fall. After the fall, patient started to have L shoulder pain. Patient walks with a walker. Patient denies any fevers or chills. No cough, shortness of breath, nausea, vomiting, abdominal pain, diarrhea, or dysuria. No melena or hematochezia.   Seen for lytic lesion in skull with diff as above, MRI can be done to better define lesion, and anemia    He had 9/29/21 laproscopic J tube placement, Had SBO due to kinked segment of small bowel. He needed laproscopic realignment of J tube. He needed venting G tube as well in 10/2021 .  He has been tolerating oral meals since Dec 2021 and G tube had been clamped with plan to remove it.  Review on labs indicated chronic anemia. No increase in creatinine or calcium  He was noted to have comminuted fracture of proximal humerus after fall seen by ortho.  CT head showed expansile lytic lesion seen involving the high   left frontal calvarium.   No other lytic lesions have been noted, making paraprotenemia as less likely etiology.  I have ordered paraproteinemia evaluation to evaluate further, so far free light chain ratio is normal.   He does not have iron or B12 def to explain chronic anemia.  He likely has chronic disease as a part of etiology of his anemia too. HBEP was ordered because of significant microcytosis.  His anemia will likely need outpatient follow up and further evaluation after dc unless pending labs are diagnostic      81M with PMH of achalasia s/p PEG (planned for removal in Feb 2022), now tolerating regular diet, admitted 1/25/22 s/p mechanical fall. Patient went to get his mail when he slipped and fell on his L side of the body. He hit his head however denies any loss of consciousness. No lightheadedness, dizziness, chest pain, palpitations, or shortness of breath prior to the fall. After the fall, patient started to have L shoulder pain. Patient walks with a walker. Patient denies any fevers or chills. No cough, shortness of breath, nausea, vomiting, abdominal pain, diarrhea, or dysuria. No melena or hematochezia.   Seen for lytic lesion in skull with diff as above, MRI can be done to better define lesion, and anemia    He had 9/29/21 laproscopic J tube placement, Had SBO due to kinked segment of small bowel. He needed laproscopic realignment of J tube. He needed venting G tube as well in 10/2021 .  He has been tolerating oral meals since Dec 2021 and G tube had been clamped with plan to remove it.  Review on labs indicated chronic anemia. No increase in creatinine or calcium  He was noted to have comminuted fracture of proximal humerus after fall seen by ortho.  CT head showed expansile lytic lesion seen involving the high   left frontal calvarium.   No other lytic lesions have been noted, making paraprotenemia as less likely etiology.  I have ordered paraproteinemia evaluation to evaluate further, so far free light chain ratio is normal.   He does not have iron or B12 def to explain chronic anemia.   HBEP was ordered because of significant microcytosis.  Patient's family told me today that he has been anemic for as long as they can remember.  When asked was he told to have Thalassemia answered, sounds familiar. He likely has anemia from Thalassemia, chronic disease but will need follow up to evaluate and plan further as needed

## 2022-01-27 NOTE — PATIENT PROFILE ADULT - FALL HARM RISK - HARM RISK INTERVENTIONS
Assistance with ambulation/Assistance OOB with selected safe patient handling equipment/Communicate Risk of Fall with Harm to all staff/Discuss with provider need for PT consult/Monitor gait and stability/Reinforce activity limits and safety measures with patient and family/Tailored Fall Risk Interventions/Visual Cue: Yellow wristband and red socks/Bed in lowest position, wheels locked, appropriate side rails in place/Call bell, personal items and telephone in reach/Instruct patient to call for assistance before getting out of bed or chair/Non-slip footwear when patient is out of bed/[place] to call system/Physically safe environment - no spills, clutter or unnecessary equipment/Purposeful Proactive Rounding/Room/bathroom lighting operational, light cord in reach

## 2022-01-28 LAB
HEMOGLOBIN INTERPRETATION: SIGNIFICANT CHANGE UP
HGB A MFR BLD: 95.3 % — LOW (ref 95.8–98)
HGB A2 MFR BLD: 4.7 % — HIGH (ref 2–3.2)

## 2022-01-28 RX ADMIN — Medication 20 MILLIGRAM(S): at 12:39

## 2022-01-28 RX ADMIN — ENOXAPARIN SODIUM 40 MILLIGRAM(S): 100 INJECTION SUBCUTANEOUS at 12:39

## 2022-01-28 RX ADMIN — Medication 3 MILLIGRAM(S): at 22:02

## 2022-01-28 RX ADMIN — POLYETHYLENE GLYCOL 3350 17 GRAM(S): 17 POWDER, FOR SOLUTION ORAL at 12:39

## 2022-01-28 NOTE — PROGRESS NOTE ADULT - SUBJECTIVE AND OBJECTIVE BOX
81M with PMH of achalasia s/p PEG (planned for removal in Feb 2022), now tolerating regular diet, admitted 1/25/22 s/p mechanical fall. Patient went to get his mail when he slipped and fell on his L side of the body. He hit his head however denies any loss of consciousness. No lightheadedness, dizziness, chest pain, palpitations, or shortness of breath prior to the fall. After the fall, patient started to have L shoulder pain. Patient walks with a walker. Patient denies any fevers or chills. No cough, shortness of breath, nausea, vomiting, abdominal pain, diarrhea, or dysuria. No melena or hematochezia.   Seen for lytic lesion in skull and anemia  PAST MEDICAL & SURGICAL HISTORY:  Ganglion cyst of Left wrist    Eczema    Achalasia    H/O repair of left rotator cuff  RIGHT SHOULDER    PEG (percutaneous endoscopic gastrostomy) status      Medications:  acetaminophen     Tablet .. 650 milliGRAM(s) Oral every 6 hours PRN Mild Pain (1 - 3), Moderate Pain (4 - 6)  enoxaparin Injectable 40 milliGRAM(s) SubCutaneous daily  FLUoxetine 20 milliGRAM(s) Oral daily  influenza  Vaccine (HIGH DOSE) 0.7 milliLiter(s) IntraMuscular once  melatonin 3 milliGRAM(s) Oral at bedtime  oxyCODONE    IR 2.5 milliGRAM(s) Oral every 6 hours PRN Severe Pain (7 - 10)  polyethylene glycol 3350 17 Gram(s) Oral daily    Labs:  CBC Full  -  ( 27 Jan 2022 07:01 )  WBC Count : 7.03 K/uL  Hemoglobin : 8.4 g/dL  Hematocrit : 26.6 %  Platelet Count - Automated : 159 K/uL  Mean Cell Volume : 67.9 fl  Mean Cell Hemoglobin : 21.4 pg  Mean Cell Hemoglobin Concentration : 31.6 gm/dL  Auto Neutrophil # : 4.73 K/uL  Auto Lymphocyte # : 1.12 K/uL  Auto Monocyte # : 0.77 K/uL  Auto Eosinophil # : 0.32 K/uL  Auto Basophil # : 0.07 K/uL  Auto Neutrophil % : 67.2 %  Auto Lymphocyte % : 15.9 %  Auto Monocyte % : 11.0 %  Auto Eosinophil % : 4.6 %  Auto Basophil % : 1.0 %    01-27    140  |  105  |  35<H>  ----------------------------<  102<H>  4.2   |  24  |  0.60    Ca    9.0      27 Jan 2022 07:01    TPro  6.1  /  Alb  3.6  /  TBili  0.6  /  DBili  x   /  AST  17  /  ALT  31  /  AlkPhos  68  01-27      Radiology:             ROS:  Patient comfortable without distress  No SOB or chest pain  No palpitation  Had PEG removed on 1/28/22  No weakness of extremities  No skin changes or swelling of legs    Vital Signs Last 24 Hrs  T(C): 36.9 (28 Jan 2022 14:57), Max: 37.2 (28 Jan 2022 04:03)  T(F): 98.4 (28 Jan 2022 14:57), Max: 99 (28 Jan 2022 04:03)  HR: 65 (28 Jan 2022 14:57) (61 - 72)  BP: 109/63 (28 Jan 2022 14:57) (109/63 - 143/69)  BP(mean): --  RR: 18 (28 Jan 2022 14:57) (12 - 18)  SpO2: 99% (28 Jan 2022 14:57) (97% - 100%)    Physical exam:  Patient alert and oriented  No distress  CVS: S1, S2   Chest: bilateral breath sound without rales  Abdomen: Removed PEG  No focal neuro deficit  No edema      Assessment and Plan:Height (cm): 177.8 (01-28 @ 09:32)  Weight (kg): 79.8 (01-28 @ 09:32)  BMI (kg/m2): 25.2 (01-28 @ 09:32)  BSA (m2): 1.98 (01-28 @ 09:32)

## 2022-01-28 NOTE — PROGRESS NOTE ADULT - ASSESSMENT
81M with PMH of achalasia s/p PEG (planned for removal in Feb 2022), now tolerating regular diet, admitted 1/25/22 s/p mechanical fall. Patient went to get his mail when he slipped and fell on his L side of the body. He hit his head however denies any loss of consciousness. No lightheadedness, dizziness, chest pain, palpitations, or shortness of breath prior to the fall. After the fall, patient started to have L shoulder pain. Patient walks with a walker. Patient denies any fevers or chills. No cough, shortness of breath, nausea, vomiting, abdominal pain, diarrhea, or dysuria. No melena or hematochezia.   Seen for lytic lesion in skull with diff as above, MRI can be done to better define lesion, and anemia    He had 9/29/21 laproscopic J tube placement, Had SBO due to kinked segment of small bowel. He needed laproscopic realignment of J tube. He needed venting G tube as well in 10/2021 .  He has been tolerating oral meals since Dec 2021 and G tube had been clamped with plan to remove it.  Review on labs indicated chronic anemia. No increase in creatinine or calcium  He was noted to have comminuted fracture of proximal humerus after fall seen by ortho. CT head showed expansile lytic lesion seen involving the high   left frontal calvarium.   No other lytic lesions have been noted, making paraprotenemia as less likely etiology.  I have ordered paraproteinemia evaluation to evaluate further, so far free light chain ratio is normal.   He does not have iron or B12 def to explain chronic anemia.   HBEP shows Beta Thal trait, however his anemia is too severe may be from component of chronic disease added and will need follow up

## 2022-01-28 NOTE — PROGRESS NOTE ADULT - SUBJECTIVE AND OBJECTIVE BOX
INTERVAL HPI/OVERNIGHT EVENTS:  Pt seen and examined at bedside.     Allergies/Intolerance: No Known Allergies      MEDICATIONS  (STANDING):  enoxaparin Injectable 40 milliGRAM(s) SubCutaneous daily  FLUoxetine 20 milliGRAM(s) Oral daily  influenza  Vaccine (HIGH DOSE) 0.7 milliLiter(s) IntraMuscular once  melatonin 3 milliGRAM(s) Oral at bedtime  polyethylene glycol 3350 17 Gram(s) Oral daily  sodium chloride 0.9%. 1000 milliLiter(s) (70 mL/Hr) IV Continuous <Continuous>    MEDICATIONS  (PRN):  acetaminophen     Tablet .. 650 milliGRAM(s) Oral every 6 hours PRN Mild Pain (1 - 3), Moderate Pain (4 - 6)  oxyCODONE    IR 2.5 milliGRAM(s) Oral every 6 hours PRN Severe Pain (7 - 10)        ROS: all systems reviewed and wnl      PHYSICAL EXAMINATION:  Vital Signs Last 24 Hrs  T(C): 36.9 (28 Jan 2022 11:18), Max: 37.2 (28 Jan 2022 04:03)  T(F): 98.5 (28 Jan 2022 11:18), Max: 99 (28 Jan 2022 04:03)  HR: 62 (28 Jan 2022 11:18) (61 - 76)  BP: 110/64 (28 Jan 2022 11:18) (110/64 - 143/69)  BP(mean): --  RR: 18 (28 Jan 2022 11:18) (12 - 18)  SpO2: 99% (28 Jan 2022 11:18) (97% - 100%)  CAPILLARY BLOOD GLUCOSE          01-27 @ 07:01  -  01-28 @ 07:00  --------------------------------------------------------  IN: 1320 mL / OUT: 525 mL / NET: 795 mL        GENERAL:   NECK: supple, No JVD  CHEST/LUNG: clear to auscultation bilaterally; no rales, rhonchi, or wheezing b/l  HEART: normal S1, S2  ABDOMEN: BS+, soft, ND, NT   EXTREMITIES:  pulses palpable; no clubbing, cyanosis, or edema b/l LEs  SKIN: no rashes or lesions      LABS:                        8.4    7.03  )-----------( 159      ( 27 Jan 2022 07:01 )             26.6     01-27    140  |  105  |  35<H>  ----------------------------<  102<H>  4.2   |  24  |  0.60    Ca    9.0      27 Jan 2022 07:01    TPro  6.1  /  Alb  3.6  /  TBili  0.6  /  DBili  x   /  AST  17  /  ALT  31  /  AlkPhos  68  01-27               INTERVAL HPI/OVERNIGHT EVENTS:  Pt seen and examined at bedside.     Allergies/Intolerance: No Known Allergies      MEDICATIONS  (STANDING):  enoxaparin Injectable 40 milliGRAM(s) SubCutaneous daily  FLUoxetine 20 milliGRAM(s) Oral daily  influenza  Vaccine (HIGH DOSE) 0.7 milliLiter(s) IntraMuscular once  melatonin 3 milliGRAM(s) Oral at bedtime  polyethylene glycol 3350 17 Gram(s) Oral daily  sodium chloride 0.9%. 1000 milliLiter(s) (70 mL/Hr) IV Continuous <Continuous>    MEDICATIONS  (PRN):  acetaminophen     Tablet .. 650 milliGRAM(s) Oral every 6 hours PRN Mild Pain (1 - 3), Moderate Pain (4 - 6)  oxyCODONE    IR 2.5 milliGRAM(s) Oral every 6 hours PRN Severe Pain (7 - 10)        ROS: all systems reviewed and wnl      PHYSICAL EXAMINATION:  Vital Signs Last 24 Hrs  T(C): 36.9 (28 Jan 2022 11:18), Max: 37.2 (28 Jan 2022 04:03)  T(F): 98.5 (28 Jan 2022 11:18), Max: 99 (28 Jan 2022 04:03)  HR: 62 (28 Jan 2022 11:18) (61 - 76)  BP: 110/64 (28 Jan 2022 11:18) (110/64 - 143/69)  BP(mean): --  RR: 18 (28 Jan 2022 11:18) (12 - 18)  SpO2: 99% (28 Jan 2022 11:18) (97% - 100%)  CAPILLARY BLOOD GLUCOSE          01-27 @ 07:01  -  01-28 @ 07:00  --------------------------------------------------------  IN: 1320 mL / OUT: 525 mL / NET: 795 mL        GENERAL: stable, comfortable on RA  NECK: supple, No JVD  CHEST/LUNG: clear to auscultation bilaterally; no rales, rhonchi, or wheezing b/l  HEART: normal S1, S2  ABDOMEN: BS+, soft, ND, NT   EXTREMITIES:  pulses palpable; no clubbing, cyanosis, or edema b/l LEs  SKIN: no rashes or lesions      LABS:                        8.4    7.03  )-----------( 159      ( 27 Jan 2022 07:01 )             26.6     01-27    140  |  105  |  35<H>  ----------------------------<  102<H>  4.2   |  24  |  0.60    Ca    9.0      27 Jan 2022 07:01    TPro  6.1  /  Alb  3.6  /  TBili  0.6  /  DBili  x   /  AST  17  /  ALT  31  /  AlkPhos  68  01-27

## 2022-01-28 NOTE — PROGRESS NOTE ADULT - ASSESSMENT
A/P: 82yo Male     h/o  achalasia s/p PEG (planned for removal in Feb),    now tolerating regular diet,    presents s/p mechanical fall found to have L humerus fracture.   - fall precautions  - pain control with prn oxycodone.    * : Lytic lesion of  calvarium    r/o myeloma   expansile lytic lesion seen involving the high left frontal calvarium on CTH  oncology  ohri\  cxr,  opacity/  ct chest,  minimal fluid  w/p  per  oncology    * on dvt ppx  * pt  is  orthostatic, on iv  fluids    daily orthostatics       ra< from: CT Head No Cont (01.25.22 @ 16:32) >  IMPRESSION: Lytic lesion involving the calvarium as described above.  Degenerative changes involving the cervical spine.  < end of copied text >       rad< from: Xray Chest 1 View- PORTABLE-Urgent (Xray Chest 1 View- PORTABLE-Urgent .) (01.25.22 @ 16:35) >  IMPRESSION:  Faint left mid lower lung opacity which may represent developing   pneumonia.  < end of copied text >    *      A/P: 80yo Male h/o  achalasia s/p PEG (planned for removal in Feb), now tolerating regular diet, presents s/p mechanical fall found to have L humerus fracture.   - fall precautions,  pain control with prn oxycodone.    Lytic lesion of  calvarium. r/o myeloma. Lytic lesion seen involving the high left frontal calvarium on CTH  oncology dr dennis. cxr,  opacity/  ct chest,  minimal fluid.     Plan: Will stop IVF, advance diet, PEG removed. Discharge to rehab when bed available. SERGIO.

## 2022-01-28 NOTE — PRE PROCEDURE NOTE - PRE PROCEDURE EVALUATION
Attending Physician:   Dr. Carrasco                         Procedure:   EGD-PEG Removal    Indication for Procedure:  Tolerating a regular diet  ________________________________________________________  PAST MEDICAL & SURGICAL HISTORY:    Ganglion cyst of Left wrist  Eczema  Achalasia    H/O repair of left rotator cuff RIGHT SHOULDER  PEG (percutaneous endoscopic gastrostomy) status      ALLERGIES:  No Known Allergies    HOME MEDICATIONS:  Centrum Silver oral tablet: 1 tab(s) orally once a day  Colace 100 mg oral capsule: 1 cap(s) orally once a day  diazePAM 2 mg oral tablet: orally once a day (at bedtime)  FLUoxetine 20 mg oral capsule: 1 cap(s) orally once a day  MiraLax oral powder for reconstitution: 17 gram(s) orally once a day  mirtazapine 15 mg oral tablet: 1 tab(s) orally once a day (at bedtime)  multivitamin: 1 tab(s) orally once a day    AICD/PPM: [ ] yes   [X ] no    PERTINENT LAB DATA:                        8.4    7.03  )-----------( 159      ( 27 Jan 2022 07:01 )             26.6     01-27    140  |  105  |  35<H>  ----------------------------<  102<H>  4.2   |  24  |  0.60    Ca    9.0      27 Jan 2022 07:01  TPro  6.1  /  Alb  3.6  /  TBili  0.6  /  DBili  x   /  AST  17  /  ALT  31  /  AlkPhos  68  01-27    PHYSICAL EXAMINATION:    T(C): 37  HR: 66  BP: 119/68  RR: 18  SpO2: 99%    Constitutional: NAD    Neck:  No JVD  Respiratory: CTAB/L  Cardiovascular: S1 and S2  Gastrointestinal: BS+, soft, NT/ND  Extremities: No peripheral edema  Neurological: A/O x 4      COMMENTS:    The patient is a suitable candidate for the planned procedure unless box checked [ ]  No, explain:

## 2022-01-29 RX ORDER — SENNA PLUS 8.6 MG/1
2 TABLET ORAL AT BEDTIME
Refills: 0 | Status: DISCONTINUED | OUTPATIENT
Start: 2022-01-29 | End: 2022-01-31

## 2022-01-29 RX ADMIN — Medication 3 MILLIGRAM(S): at 21:54

## 2022-01-29 RX ADMIN — Medication 20 MILLIGRAM(S): at 11:32

## 2022-01-29 RX ADMIN — POLYETHYLENE GLYCOL 3350 17 GRAM(S): 17 POWDER, FOR SOLUTION ORAL at 11:32

## 2022-01-29 RX ADMIN — ENOXAPARIN SODIUM 40 MILLIGRAM(S): 100 INJECTION SUBCUTANEOUS at 11:32

## 2022-01-29 RX ADMIN — SENNA PLUS 2 TABLET(S): 8.6 TABLET ORAL at 21:55

## 2022-01-29 NOTE — PROGRESS NOTE ADULT - SUBJECTIVE AND OBJECTIVE BOX
INTERVAL HPI/OVERNIGHT EVENTS:  Pt seen and examined at bedside.     Allergies/Intolerance: No Known Allergies      MEDICATIONS  (STANDING):  enoxaparin Injectable 40 milliGRAM(s) SubCutaneous daily  FLUoxetine 20 milliGRAM(s) Oral daily  influenza  Vaccine (HIGH DOSE) 0.7 milliLiter(s) IntraMuscular once  melatonin 3 milliGRAM(s) Oral at bedtime  polyethylene glycol 3350 17 Gram(s) Oral daily    MEDICATIONS  (PRN):  acetaminophen     Tablet .. 650 milliGRAM(s) Oral every 6 hours PRN Mild Pain (1 - 3), Moderate Pain (4 - 6)  oxyCODONE    IR 2.5 milliGRAM(s) Oral every 6 hours PRN Severe Pain (7 - 10)        ROS: all systems reviewed and wnl      PHYSICAL EXAMINATION:  Vital Signs Last 24 Hrs  T(C): 37.1 (28 Jan 2022 21:06), Max: 37.2 (28 Jan 2022 04:03)  T(F): 98.8 (28 Jan 2022 21:06), Max: 99 (28 Jan 2022 04:03)  HR: 67 (28 Jan 2022 21:06) (61 - 72)  BP: 115/69 (28 Jan 2022 21:06) (109/63 - 143/69)  BP(mean): --  RR: 18 (28 Jan 2022 21:06) (12 - 18)  SpO2: 98% (28 Jan 2022 21:06) (97% - 100%)  CAPILLARY BLOOD GLUCOSE          01-27 @ 07:01 - 01-28 @ 07:00  --------------------------------------------------------  IN: 1320 mL / OUT: 525 mL / NET: 795 mL    01-28 @ 07:01 - 01-29 @ 00:28  --------------------------------------------------------  IN: 240 mL / OUT: 575 mL / NET: -335 mL        GENERAL: stable, right arm sling in place.   NECK: supple, No JVD  CHEST/LUNG: clear to auscultation bilaterally; no rales, rhonchi, or wheezing b/l  HEART: normal S1, S2  ABDOMEN: BS+, soft, ND, NT   EXTREMITIES:  pulses palpable; no clubbing, cyanosis, or edema b/l LEs  SKIN: no rashes or lesions      LABS:                        8.4    7.03  )-----------( 159      ( 27 Jan 2022 07:01 )             26.6     01-27    140  |  105  |  35<H>  ----------------------------<  102<H>  4.2   |  24  |  0.60    Ca    9.0      27 Jan 2022 07:01    TPro  6.1  /  Alb  3.6  /  TBili  0.6  /  DBili  x   /  AST  17  /  ALT  31  /  AlkPhos  68  01-27

## 2022-01-29 NOTE — PROGRESS NOTE ADULT - ASSESSMENT
A/P: 82yo Male h/o  achalasia s/p PEG (planned for removal in Feb), now tolerating regular diet, presents s/p mechanical fall found to have L humerus fracture.   - fall precautions,  pain control with prn oxycodone.    Lytic lesion of  calvarium. r/o myeloma. Lytic lesion seen involving the high left frontal calvarium on CTH  oncology dr dennis. cxr,  opacity/  ct chest,  minimal fluid.     Plan: Will stop IVF, advance diet, PEG removed. Discharge to rehab when bed available. SERGIO.      Spoke to wife about rehab Friday.

## 2022-01-29 NOTE — PROGRESS NOTE ADULT - ASSESSMENT
humeral fracture  achalasia      achalasia was treated with oliva (dr. rooney)   s/p PEG removal  diet as tolerated   will follow

## 2022-01-29 NOTE — PROGRESS NOTE ADULT - SUBJECTIVE AND OBJECTIVE BOX
[facility]  Allan Dale PA-C  [facility] [place] [address]  [phone]      INTERVAL HPI/OVERNIGHT EVENTS:  pt seen and examined, feeling well w/o complaints  s/p PEG removal  tolerating oral diet     MEDICATIONS  (STANDING):  enoxaparin Injectable 40 milliGRAM(s) SubCutaneous daily  FLUoxetine 20 milliGRAM(s) Oral daily  melatonin 3 milliGRAM(s) Oral at bedtime  polyethylene glycol 3350 17 Gram(s) Oral daily  sodium chloride 0.9%. 1000 milliLiter(s) (70 mL/Hr) IV Continuous <Continuous>    MEDICATIONS  (PRN):  acetaminophen     Tablet .. 650 milliGRAM(s) Oral every 6 hours PRN Mild Pain (1 - 3), Moderate Pain (4 - 6)  oxyCODONE    IR 2.5 milliGRAM(s) Oral every 6 hours PRN Severe Pain (7 - 10)      Allergies    No Known Allergies    Intolerances        ROS:   General:  No wt loss, fevers, chills, night sweats, fatigue,   Eyes:  Good vision, no reported pain  ENT:  No sore throat, pain, runny nose, dysphagia  CV:  No pain, palpitations, hypo/hypertension  Resp:  No dyspnea, cough, tachypnea, wheezing  GI:  No pain, No nausea, No vomiting, No diarrhea, No constipation, No weight loss, No fever, No pruritis, No rectal bleeding, No tarry stools, No dysphagia,  :  No pain, bleeding, incontinence, nocturia  Muscle:  + pain, weakness  Neuro:  No weakness, tingling, memory problems  Psych:  No fatigue, insomnia, mood problems, depression  Endocrine:  No polyuria, polydipsia, cold/heat intolerance  Heme:  No petechiae, ecchymosis, easy bruisability  Skin:  No rash, tattoos, scars, edema      PHYSICAL EXAM:   Vital Signs Last 24 Hrs  T(C): 37.2 (29 Jan 2022 04:11), Max: 37.2 (29 Jan 2022 04:11)  T(F): 99 (29 Jan 2022 04:11), Max: 99 (29 Jan 2022 04:11)  HR: 65 (29 Jan 2022 04:11) (61 - 67)  BP: 102/64 (29 Jan 2022 04:11) (102/64 - 143/69)  BP(mean): --  RR: 18 (29 Jan 2022 04:11) (12 - 18)  SpO2: 97% (29 Jan 2022 04:11) (97% - 100%)  Daily     Daily     GENERAL:  Appears stated age,   HEENT:  NC/AT,    CHEST:  Full & symmetric excursion,   HEART:  Regular rhythm,  ABDOMEN:  Soft, non-tender, non-distended,  EXTEREMITIES:  no cyanosis  SKIN:  No rash  NEURO:  Alert,       LABS:                        8.4    7.03  )-----------( 159      ( 27 Jan 2022 07:01 )             26.6     01-27    140  |  105  |  35<H>  ----------------------------<  102<H>  4.2   |  24  |  0.60    Ca    9.0      27 Jan 2022 07:01    TPro  6.1  /  Alb  3.6  /  TBili  0.6  /  DBili  x   /  AST  17  /  ALT  31  /  AlkPhos  68  01-27          RADIOLOGY & ADDITIONAL TESTS:

## 2022-01-30 ENCOUNTER — TRANSCRIPTION ENCOUNTER (OUTPATIENT)
Age: 82
End: 2022-01-30

## 2022-01-30 LAB
ANION GAP SERPL CALC-SCNC: 12 MMOL/L — SIGNIFICANT CHANGE UP (ref 5–17)
BUN SERPL-MCNC: 24 MG/DL — HIGH (ref 7–23)
CALCIUM SERPL-MCNC: 9.1 MG/DL — SIGNIFICANT CHANGE UP (ref 8.4–10.5)
CHLORIDE SERPL-SCNC: 101 MMOL/L — SIGNIFICANT CHANGE UP (ref 96–108)
CO2 SERPL-SCNC: 24 MMOL/L — SIGNIFICANT CHANGE UP (ref 22–31)
CREAT SERPL-MCNC: 0.6 MG/DL — SIGNIFICANT CHANGE UP (ref 0.5–1.3)
CREATININE, URINE RESULT: 142 MG/DL — SIGNIFICANT CHANGE UP
GLUCOSE SERPL-MCNC: 111 MG/DL — HIGH (ref 70–99)
HCT VFR BLD CALC: 27.5 % — LOW (ref 39–50)
HGB BLD-MCNC: 8.6 G/DL — LOW (ref 13–17)
INTERPRETATION 24H UR IFE-IMP: SIGNIFICANT CHANGE UP
INTERPRETATION 24H UR IFE-IMP: SIGNIFICANT CHANGE UP
MCHC RBC-ENTMCNC: 21.4 PG — LOW (ref 27–34)
MCHC RBC-ENTMCNC: 31.3 GM/DL — LOW (ref 32–36)
MCV RBC AUTO: 68.4 FL — LOW (ref 80–100)
NRBC # BLD: 0 /100 WBCS — SIGNIFICANT CHANGE UP (ref 0–0)
PLATELET # BLD AUTO: 190 K/UL — SIGNIFICANT CHANGE UP (ref 150–400)
POTASSIUM SERPL-MCNC: 4.3 MMOL/L — SIGNIFICANT CHANGE UP (ref 3.5–5.3)
POTASSIUM SERPL-SCNC: 4.3 MMOL/L — SIGNIFICANT CHANGE UP (ref 3.5–5.3)
PROT ?TM UR-MCNC: 17 MG/DL — HIGH (ref 0–12)
RBC # BLD: 4.02 M/UL — LOW (ref 4.2–5.8)
RBC # FLD: 15.5 % — HIGH (ref 10.3–14.5)
SODIUM SERPL-SCNC: 137 MMOL/L — SIGNIFICANT CHANGE UP (ref 135–145)
WBC # BLD: 6.7 K/UL — SIGNIFICANT CHANGE UP (ref 3.8–10.5)
WBC # FLD AUTO: 6.7 K/UL — SIGNIFICANT CHANGE UP (ref 3.8–10.5)

## 2022-01-30 RX ADMIN — SENNA PLUS 2 TABLET(S): 8.6 TABLET ORAL at 22:05

## 2022-01-30 RX ADMIN — Medication 20 MILLIGRAM(S): at 11:05

## 2022-01-30 RX ADMIN — POLYETHYLENE GLYCOL 3350 17 GRAM(S): 17 POWDER, FOR SOLUTION ORAL at 11:05

## 2022-01-30 RX ADMIN — Medication 3 MILLIGRAM(S): at 22:05

## 2022-01-30 RX ADMIN — ENOXAPARIN SODIUM 40 MILLIGRAM(S): 100 INJECTION SUBCUTANEOUS at 11:05

## 2022-01-30 NOTE — DISCHARGE NOTE PROVIDER - NSDCMRMEDTOKEN_GEN_ALL_CORE_FT
Centrum Silver oral tablet: 1 tab(s) orally once a day  Colace 100 mg oral capsule: 1 cap(s) orally once a day  diazePAM 2 mg oral tablet: orally once a day (at bedtime)  FLUoxetine 20 mg oral capsule: 1 cap(s) orally once a day  MiraLax oral powder for reconstitution: 17 gram(s) orally once a day  mirtazapine 15 mg oral tablet: 1 tab(s) orally once a day (at bedtime)  multivitamin: 1 tab(s) orally once a day   acetaminophen 325 mg oral tablet: 2 tab(s) orally every 6 hours, As needed, Mild Pain (1 - 3), Moderate Pain (4 - 6)  Centrum Silver oral tablet: 1 tab(s) orally once a day  Colace 100 mg oral capsule: 1 cap(s) orally once a day  diazePAM 2 mg oral tablet: orally once a day (at bedtime)  FLUoxetine 20 mg oral capsule: 1 cap(s) orally once a day  MiraLax oral powder for reconstitution: 17 gram(s) orally once a day  mirtazapine 15 mg oral tablet: 1 tab(s) orally once a day (at bedtime)  multivitamin: 1 tab(s) orally once a day   acetaminophen 325 mg oral tablet: 2 tab(s) orally every 6 hours, As needed, Mild Pain (1 - 3), Moderate Pain (4 - 6)  Centrum Silver oral tablet: 1 tab(s) orally once a day  Colace 100 mg oral capsule: 1 cap(s) orally once a day  FLUoxetine 20 mg oral capsule: 1 cap(s) orally once a day  MiraLax oral powder for reconstitution: 17 gram(s) orally once a day  mirtazapine 15 mg oral tablet: 1 tab(s) orally once a day (at bedtime)  multivitamin: 1 tab(s) orally once a day   acetaminophen 325 mg oral tablet: 2 tab(s) orally every 6 hours, As needed, Mild Pain (1 - 3), Moderate Pain (4 - 6)  Centrum Silver oral tablet: 1 tab(s) orally once a day  Colace 100 mg oral capsule: 1 cap(s) orally once a day  FLUoxetine 20 mg oral capsule: 1 cap(s) orally once a day  MiraLax oral powder for reconstitution: 17 gram(s) orally once a day  multivitamin: 1 tab(s) orally once a day

## 2022-01-30 NOTE — PROGRESS NOTE ADULT - SUBJECTIVE AND OBJECTIVE BOX
[facility]  Allan Dale PA-C  [facility] [address]  [phone]      INTERVAL HPI/OVERNIGHT EVENTS:  pt seen and examined, feeling well w/o complaints  tolerating PO diet, good appetite     MEDICATIONS  (STANDING):  enoxaparin Injectable 40 milliGRAM(s) SubCutaneous daily  FLUoxetine 20 milliGRAM(s) Oral daily  melatonin 3 milliGRAM(s) Oral at bedtime  polyethylene glycol 3350 17 Gram(s) Oral daily  sodium chloride 0.9%. 1000 milliLiter(s) (70 mL/Hr) IV Continuous <Continuous>    MEDICATIONS  (PRN):  acetaminophen     Tablet .. 650 milliGRAM(s) Oral every 6 hours PRN Mild Pain (1 - 3), Moderate Pain (4 - 6)  oxyCODONE    IR 2.5 milliGRAM(s) Oral every 6 hours PRN Severe Pain (7 - 10)      Allergies    No Known Allergies    Intolerances        ROS:   General:  No wt loss, fevers, chills, night sweats, fatigue,   Eyes:  Good vision, no reported pain  ENT:  No sore throat, pain, runny nose, dysphagia  CV:  No pain, palpitations, hypo/hypertension  Resp:  No dyspnea, cough, tachypnea, wheezing  GI:  No pain, No nausea, No vomiting, No diarrhea, No constipation, No weight loss, No fever, No pruritis, No rectal bleeding, No tarry stools, No dysphagia,  :  No pain, bleeding, incontinence, nocturia  Muscle:  + pain, weakness  Neuro:  No weakness, tingling, memory problems  Psych:  No fatigue, insomnia, mood problems, depression  Endocrine:  No polyuria, polydipsia, cold/heat intolerance  Heme:  No petechiae, ecchymosis, easy bruisability  Skin:  No rash, tattoos, scars, edema      PHYSICAL EXAM:   Vital Signs Last 24 Hrs  T(C): 36.9 (30 Jan 2022 11:02), Max: 37.1 (29 Jan 2022 19:27)  T(F): 98.5 (30 Jan 2022 11:02), Max: 98.7 (29 Jan 2022 19:27)  HR: 64 (30 Jan 2022 11:02) (61 - 66)  BP: 109/69 (30 Jan 2022 11:02) (104/67 - 109/69)  BP(mean): --  RR: 18 (30 Jan 2022 11:02) (18 - 18)  SpO2: 98% (30 Jan 2022 11:02) (98% - 99%)  Daily     Daily     GENERAL:  Appears stated age,   HEENT:  NC/AT,    CHEST:  Full & symmetric excursion,   HEART:  Regular rhythm,  ABDOMEN:  Soft, non-tender, non-distended,  EXTEREMITIES:  no cyanosis, sling in place left arm   SKIN:  No rash  NEURO:  Alert,       LABS:                        8.4    7.03  )-----------( 159      ( 27 Jan 2022 07:01 )             26.6     01-27    140  |  105  |  35<H>  ----------------------------<  102<H>  4.2   |  24  |  0.60    Ca    9.0      27 Jan 2022 07:01    TPro  6.1  /  Alb  3.6  /  TBili  0.6  /  DBili  x   /  AST  17  /  ALT  31  /  AlkPhos  68  01-27          RADIOLOGY & ADDITIONAL TESTS:

## 2022-01-30 NOTE — DISCHARGE NOTE PROVIDER - PROVIDER TOKENS
PROVIDER:[TOKEN:[[id number]]] PROVIDER:[TOKEN:[[id number]]],PROVIDER:[TOKEN:[[id number]]],PROVIDER:[TOKEN:[[id number]]]

## 2022-01-30 NOTE — PROGRESS NOTE ADULT - SUBJECTIVE AND OBJECTIVE BOX
INTERVAL HPI/OVERNIGHT EVENTS:  Pt seen and examined at bedside.     Allergies/Intolerance: No Known Allergies      MEDICATIONS  (STANDING):  enoxaparin Injectable 40 milliGRAM(s) SubCutaneous daily  FLUoxetine 20 milliGRAM(s) Oral daily  influenza  Vaccine (HIGH DOSE) 0.7 milliLiter(s) IntraMuscular once  melatonin 3 milliGRAM(s) Oral at bedtime  polyethylene glycol 3350 17 Gram(s) Oral daily  senna 2 Tablet(s) Oral at bedtime    MEDICATIONS  (PRN):  acetaminophen     Tablet .. 650 milliGRAM(s) Oral every 6 hours PRN Mild Pain (1 - 3), Moderate Pain (4 - 6)  oxyCODONE    IR 2.5 milliGRAM(s) Oral every 6 hours PRN Severe Pain (7 - 10)        ROS: all systems reviewed and wnl      PHYSICAL EXAMINATION:  Vital Signs Last 24 Hrs  T(C): 36.8 (30 Jan 2022 04:29), Max: 37.1 (29 Jan 2022 19:27)  T(F): 98.3 (30 Jan 2022 04:29), Max: 98.7 (29 Jan 2022 19:27)  HR: 64 (30 Jan 2022 04:29) (61 - 66)  BP: 104/67 (30 Jan 2022 04:29) (104/67 - 107/65)  BP(mean): --  RR: 18 (30 Jan 2022 04:29) (18 - 18)  SpO2: 98% (30 Jan 2022 04:29) (98% - 99%)  CAPILLARY BLOOD GLUCOSE          01-29 @ 07:01  -  01-30 @ 07:00  --------------------------------------------------------  IN: 0 mL / OUT: 1050 mL / NET: -1050 mL        GENERAL: stable on RA, comfortable in bed, right arm sling in place.   NECK: supple, No JVD  CHEST/LUNG: clear to auscultation bilaterally; no rales, rhonchi, or wheezing b/l  HEART: normal S1, S2  ABDOMEN: BS+, soft, ND, NT   EXTREMITIES:  pulses palpable; no clubbing, cyanosis, or edema b/l LEs  SKIN: no rashes or lesions      LABS:

## 2022-01-30 NOTE — PROGRESS NOTE ADULT - ASSESSMENT
humeral fracture  achalasia      achalasia was treated with oliva (dr. rooney)   s/p PEG removal  diet as tolerated   will follow  dc planning as per primary

## 2022-01-30 NOTE — PROGRESS NOTE ADULT - ASSESSMENT
A/P: 82yo Male h/o  achalasia s/p PEG (planned for removal in Feb), now tolerating regular diet, presents s/p mechanical fall found to have L humerus fracture.  Fall precautions,  pain control with prn oxycodone.    Lytic lesion of  calvarium. r/o myeloma. Lytic lesion seen involving the high left frontal calvarium on CTH  oncology dr dennis. cxr,  opacity/  ct chest,  minimal fluid.       Plan: Will stop IVF, advance diet, PEG removed. Discharge to rehab or home Monday.      Spoke to wife about rehab Friday.

## 2022-01-30 NOTE — DISCHARGE NOTE PROVIDER - CARE PROVIDER_API CALL
Carlos Marin  [address], [address]  [place] [address]  Phone: [phone]  Fax: [phone]  Follow Up Time:    Carlos Marin  [facility]  [address]  [place] [address]  Phone: [phone]  Fax: [phone]  Follow Up Time:     Mikaela Nunez  HEMATOLOGY  [address]  [place] [address]  Phone: [phone]  Fax: [phone]  Follow Up Time:     Peyman Hernandez)  Orthopaedic Surgery  [address]  [place] [address]  Phone: [phone]  Fax: [phone]  Follow Up Time:

## 2022-01-30 NOTE — DISCHARGE NOTE PROVIDER - HOSPITAL COURSE
81M with PMH of achalasia s/p PEG (planned for removal in Feb), now tolerating regular diet, presents s/p mechanical fall found to have L humerus fracture.     Dx: S/p fall with Left Humerus FX- ortho- NWB LUE in sling         Lytic lesion on CXR-  concern for malignancy - Seen by [facility]         Opacity of lung- CT chest w/o con           81M with PMH of achalasia s/p PEG (planned for removal in Feb), now tolerating regular diet, presents s/p mechanical fall found to have L humerus fracture.     Dx: S/p fall with Left Humerus FX- ortho- NWB LUE in sling        CT head showed expansile lytic lesion seen involving the high left frontal calvarium.   No other lytic lesions have been noted, making paraprotenemia as less likely etiology.free light chain ratio is normal.   HBEP shows Beta Thal trait, however his anemia is too severe may be from component of chronic disease added and will need follow up         Opacity of lung- CT chest w/o con- otupatient follow up     Refused [facility]. discharged home with wife and home care            81 year old male PMH of achalasia s/p PEG (planned for removal in Feb), now tolerating regular diet, presents s/p mechanical fall found to have L humerus fracture.   S/p fall with Left Humerus non-displaced fracture. Ortho consult suggested no surgery, NWB LUE in sling and follow-up X-rays. CT head showed expansile lytic lesion seen involving the high left frontal calvarium.  No other lytic lesions have been noted, making paraprotenemia as less likely etiology. SPEP is normal, HGB   stable at 8.6.  free light chain ratio is normal.   HBEP shows Beta Thal trait, however his anemia is too severe may be from component of chronic disease added and will need follow up         Opacity of lung- CT chest w/o con- otupatient follow up     Refused [facility]. discharged home with wife and home care            81 year old male PMH of achalasia s/p PEG (planned for removal in Feb), now tolerating regular diet, presents s/p mechanical fall found to have L humerus fracture.   S/p fall with Left Humerus non-displaced fracture. Ortho consult suggested no surgery, NWB LUE in sling and follow-up X-rays. CT head showed expansile lytic lesion seen involving the high left frontal calvarium.  No other lytic lesions have been noted, making paraprotenemia as less likely etiology. SPEP is normal, HGB   stable at 8.6.  Will need periodic follow up with Dr. Nunez from [facility]. Refused rehab, discharged home with left arm sling in place. See attached med list. 81 year old male PMH of achalasia s/p PEG (planned for removal in Feb), now tolerating regular diet, presents s/p mechanical fall found to have L humerus fracture.   S/p fall with Left Humerus non-displaced fracture. Ortho consult suggested no surgery, NWB LUE in sling and follow-up X-rays. CT head showed expansile lytic lesion seen involving the high left frontal calvarium.  No other lytic lesions have been noted, making paraprotenemia as less likely etiology. SPEP is normal, HGB   stable at 8.6.  SMA-7 all normal, calcium normal.     Will need periodic follow up with Dr. Nunez from [facility] and [facility]o follow-up in 2 weeks. Refused rehab, discharged home with left arm sling in place. See attached med list.

## 2022-01-30 NOTE — DISCHARGE NOTE PROVIDER - NSDCCPCAREPLAN_GEN_ALL_CORE_FT
PRINCIPAL DISCHARGE DIAGNOSIS  Diagnosis: Fracture, humerus, proximal  Assessment and Plan of Treatment: proximal humerus fracture  Pain control  NWB ANTONETTEE in sling  PT/OT  Please call if you have further question  Can follow up with Dr. Hernandez in 1-2 weeks or upon discharge        SECONDARY DISCHARGE DIAGNOSES  Diagnosis: Lytic lesion of bone on x-ray  Assessment and Plan of Treatment: CT head showed expansile lytic lesion seen involving the high   left frontal calvarium.   No other lytic lesions have been noted, making paraprotenemia as less likely etiology.    Diagnosis: Fall  Assessment and Plan of Treatment: HOME CARE INSTRUCTIONS  Have someone stay with you until you feel stable.  Do not drive, operate machinery, or play sports until your caregiver says it is okay.  Keep all follow-up appointments as directed by your caregiver.   Lie down right away if you start feeling like you might faint. Breathe deeply and steadily. Wait until all the symptoms have passed.Drink enough fluids to keep your urine clear or pale yellow.  If you are taking blood pressure or heart medicine, get up slowly, taking several minutes to sit and then stand. This can reduce dizziness.  SEEK IMMEDIATE MEDICAL CARE IF:  You have a severe headache.  You have unusual pain in the chest, abdomen, or back.  You are bleeding from the mouth or rectum, or you have black or tarry stool.  You have an irregular or very fast heartbeat.  You have pain with breathing.  You have repeated fainting or seizure-like jerking during an episode.  You faint when sitting or lying down.  You have confusion.  You have difficulty walking.  You have severe weakness.  You have vision problems.      Diagnosis: Abnormal CXR  Assessment and Plan of Treatment: Focal area of scarring or loculated fluid noted in the left lower lobe   corresponding to the abnormality seen on recent chest x-ray. If there are   prior, outside CT exams of the chest, they should be submitted for   comparison. If none are available, consider follow-up noncontrast CT   chest in 3 months.       PRINCIPAL DISCHARGE DIAGNOSIS  Diagnosis: Fracture, humerus, proximal  Assessment and Plan of Treatment: proximal humerus fracture  Pain control  NWB REMA in sling  PT/OT  Can follow up with Dr. Hernandez in 1-2 weeks or upon discharge        SECONDARY DISCHARGE DIAGNOSES  Diagnosis: Lytic lesion of bone on x-ray  Assessment and Plan of Treatment: CT head showed expansile lytic lesion seen involving the high   left frontal calvarium.   No other lytic lesions have been noted, making paraprotenemia as less likely etiology.  Follow up with [facility] as recommended    Diagnosis: Fall  Assessment and Plan of Treatment: HOME CARE INSTRUCTIONS  Have someone stay with you until you feel stable.  Do not drive, operate machinery, or play sports until your caregiver says it is okay.  Keep all follow-up appointments as directed by your caregiver.   Lie down right away if you start feeling like you might faint. Breathe deeply and steadily. Wait until all the symptoms have passed.Drink enough fluids to keep your urine clear or pale yellow.  If you are taking blood pressure or heart medicine, get up slowly, taking several minutes to sit and then stand. This can reduce dizziness.  SEEK IMMEDIATE MEDICAL CARE IF:  You have a severe headache.  You have unusual pain in the chest, abdomen, or back.  You are bleeding from the mouth or rectum, or you have black or tarry stool.  You have an irregular or very fast heartbeat.  You have pain with breathing.  You have repeated fainting or seizure-like jerking during an episode.  You faint when sitting or lying down.  You have confusion.  You have difficulty walking.  You have severe weakness.  You have vision problems.      Diagnosis: Abnormal CXR  Assessment and Plan of Treatment: Focal area of scarring or loculated fluid noted in the left lower lobe   corresponding to the abnormality seen on recent chest x-ray. If there are   prior, outside CT exams of the chest, they should be submitted for   comparison. If none are available, consider follow-up noncontrast CT   chest in 3 months.      Diagnosis: Anemia of chronic disease  Assessment and Plan of Treatment: Follow up with [facility] for management and outpatient work up       Diagnosis: History of percutaneous endoscopic gastrostomy  Assessment and Plan of Treatment: Keep old PEG site clean and dry. Monitor for redness, irriation and excess secretions.     PRINCIPAL DISCHARGE DIAGNOSIS  Diagnosis: Fracture, humerus, proximal  Assessment and Plan of Treatment: proximal humerus fracture  Pain control  NWB ANTONETTEE in sling  PT/OT  Can follow up with Dr. Hernandez in 1-2 weeks or upon discharge        SECONDARY DISCHARGE DIAGNOSES  Diagnosis: Lytic lesion of bone on x-ray  Assessment and Plan of Treatment: CT head showed expansile lytic lesion seen involving the high   left frontal calvarium.   No other lytic lesions have been noted, making paraprotenemia as less likely etiology.  [facility] Dr. [facility]    Diagnosis: Fall  Assessment and Plan of Treatment: HOME CARE INSTRUCTIONS  Have someone stay with you until you feel stable.  Do not drive, operate machinery, or play sports until your caregiver says it is okay.  Keep all follow-up appointments as directed by your caregiver.   Lie down right away if you start feeling like you might faint. Breathe deeply and steadily. Wait until all the symptoms have passed.Drink enough fluids to keep your urine clear or pale yellow.  If you are taking blood pressure or heart medicine, get up slowly, taking several minutes to sit and then stand. This can reduce dizziness.  SEEK IMMEDIATE MEDICAL CARE IF:  You have a severe headache.  You have unusual pain in the chest, abdomen, or back.  You are bleeding from the mouth or rectum, or you have black or tarry stool.  You have an irregular or very fast heartbeat.  You have pain with breathing.  You have repeated fainting or seizure-like jerking during an episode.  You faint when sitting or lying down.  You have confusion.  You have difficulty walking.  You have severe weakness.  You have vision problems.      Diagnosis: Abnormal CXR  Assessment and Plan of Treatment: Focal area of scarring or loculated fluid noted in the left lower lobe   corresponding to the abnormality seen on recent chest x-ray. If there are   prior, outside CT exams of the chest, they should be submitted for   comparison. If none are available, consider follow-up noncontrast CT   chest in 3 months.      Diagnosis: Anemia of chronic disease  Assessment and Plan of Treatment: Follow up with [facility] for management and outpatient work up       Diagnosis: History of percutaneous endoscopic gastrostomy  Assessment and Plan of Treatment: Keep old PEG site clean and dry. Monitor for redness, irriation and excess secretions.

## 2022-01-30 NOTE — DISCHARGE NOTE PROVIDER - CARE PROVIDERS DIRECT ADDRESSES
,DirectAddress_Unknown ,DirectAddress_Unknown,DirectAddress_Unknown,[email or web address]

## 2022-01-31 ENCOUNTER — TRANSCRIPTION ENCOUNTER (OUTPATIENT)
Age: 82
End: 2022-01-31

## 2022-01-31 VITALS
HEART RATE: 65 BPM | DIASTOLIC BLOOD PRESSURE: 62 MMHG | SYSTOLIC BLOOD PRESSURE: 101 MMHG | OXYGEN SATURATION: 97 % | RESPIRATION RATE: 18 BRPM | TEMPERATURE: 98 F

## 2022-01-31 LAB
% ALBUMIN: 58.1 % — SIGNIFICANT CHANGE UP
% ALPHA 1: 4.6 % — SIGNIFICANT CHANGE UP
% ALPHA 2: 9.8 % — SIGNIFICANT CHANGE UP
% BETA: 11.2 % — SIGNIFICANT CHANGE UP
% GAMMA: 16.3 % — SIGNIFICANT CHANGE UP
ALBUMIN SERPL ELPH-MCNC: 3.7 G/DL — SIGNIFICANT CHANGE UP (ref 3.6–5.5)
ALBUMIN/GLOB SERPL ELPH: 1.4 RATIO — SIGNIFICANT CHANGE UP
ALPHA1 GLOB SERPL ELPH-MCNC: 0.3 G/DL — SIGNIFICANT CHANGE UP (ref 0.1–0.4)
ALPHA2 GLOB SERPL ELPH-MCNC: 0.6 G/DL — SIGNIFICANT CHANGE UP (ref 0.5–1)
B-GLOBULIN SERPL ELPH-MCNC: 0.7 G/DL — SIGNIFICANT CHANGE UP (ref 0.5–1)
GAMMA GLOBULIN: 1 G/DL — SIGNIFICANT CHANGE UP (ref 0.6–1.6)
PROT PATTERN SERPL ELPH-IMP: SIGNIFICANT CHANGE UP
PROT SERPL-MCNC: 6.3 G/DL — SIGNIFICANT CHANGE UP (ref 6–8.3)

## 2022-01-31 PROCEDURE — 82607 VITAMIN B-12: CPT

## 2022-01-31 PROCEDURE — 85045 AUTOMATED RETICULOCYTE COUNT: CPT

## 2022-01-31 PROCEDURE — 80053 COMPREHEN METABOLIC PANEL: CPT

## 2022-01-31 PROCEDURE — 73020 X-RAY EXAM OF SHOULDER: CPT

## 2022-01-31 PROCEDURE — 83010 ASSAY OF HAPTOGLOBIN QUANT: CPT

## 2022-01-31 PROCEDURE — 84166 PROTEIN E-PHORESIS/URINE/CSF: CPT

## 2022-01-31 PROCEDURE — 84165 PROTEIN E-PHORESIS SERUM: CPT

## 2022-01-31 PROCEDURE — 36415 COLL VENOUS BLD VENIPUNCTURE: CPT

## 2022-01-31 PROCEDURE — 73060 X-RAY EXAM OF HUMERUS: CPT

## 2022-01-31 PROCEDURE — 82728 ASSAY OF FERRITIN: CPT

## 2022-01-31 PROCEDURE — 84145 PROCALCITONIN (PCT): CPT

## 2022-01-31 PROCEDURE — 73070 X-RAY EXAM OF ELBOW: CPT

## 2022-01-31 PROCEDURE — 84155 ASSAY OF PROTEIN SERUM: CPT

## 2022-01-31 PROCEDURE — 70450 CT HEAD/BRAIN W/O DYE: CPT | Mod: MA

## 2022-01-31 PROCEDURE — 73030 X-RAY EXAM OF SHOULDER: CPT

## 2022-01-31 PROCEDURE — 84156 ASSAY OF PROTEIN URINE: CPT

## 2022-01-31 PROCEDURE — 97116 GAIT TRAINING THERAPY: CPT

## 2022-01-31 PROCEDURE — U0005: CPT

## 2022-01-31 PROCEDURE — 93005 ELECTROCARDIOGRAM TRACING: CPT

## 2022-01-31 PROCEDURE — 82784 ASSAY IGA/IGD/IGG/IGM EACH: CPT

## 2022-01-31 PROCEDURE — 83540 ASSAY OF IRON: CPT

## 2022-01-31 PROCEDURE — U0003: CPT

## 2022-01-31 PROCEDURE — 97161 PT EVAL LOW COMPLEX 20 MIN: CPT

## 2022-01-31 PROCEDURE — 72170 X-RAY EXAM OF PELVIS: CPT

## 2022-01-31 PROCEDURE — 71250 CT THORAX DX C-: CPT

## 2022-01-31 PROCEDURE — 85025 COMPLETE CBC W/AUTO DIFF WBC: CPT

## 2022-01-31 PROCEDURE — 97110 THERAPEUTIC EXERCISES: CPT

## 2022-01-31 PROCEDURE — 86334 IMMUNOFIX E-PHORESIS SERUM: CPT

## 2022-01-31 PROCEDURE — 83020 HEMOGLOBIN ELECTROPHORESIS: CPT

## 2022-01-31 PROCEDURE — 83521 IG LIGHT CHAINS FREE EACH: CPT

## 2022-01-31 PROCEDURE — 83550 IRON BINDING TEST: CPT

## 2022-01-31 PROCEDURE — 85027 COMPLETE CBC AUTOMATED: CPT

## 2022-01-31 PROCEDURE — 97165 OT EVAL LOW COMPLEX 30 MIN: CPT

## 2022-01-31 PROCEDURE — 86335 IMMUNFIX E-PHORSIS/URINE/CSF: CPT

## 2022-01-31 PROCEDURE — 72125 CT NECK SPINE W/O DYE: CPT | Mod: MA

## 2022-01-31 PROCEDURE — 82962 GLUCOSE BLOOD TEST: CPT

## 2022-01-31 PROCEDURE — 99285 EMERGENCY DEPT VISIT HI MDM: CPT

## 2022-01-31 PROCEDURE — 80048 BASIC METABOLIC PNL TOTAL CA: CPT

## 2022-01-31 PROCEDURE — 71045 X-RAY EXAM CHEST 1 VIEW: CPT

## 2022-01-31 RX ORDER — DIAZEPAM 5 MG
0 TABLET ORAL
Qty: 0 | Refills: 0 | DISCHARGE

## 2022-01-31 RX ORDER — ACETAMINOPHEN 500 MG
2 TABLET ORAL
Qty: 0 | Refills: 0 | DISCHARGE
Start: 2022-01-31

## 2022-01-31 RX ORDER — MIRTAZAPINE 45 MG/1
1 TABLET, ORALLY DISINTEGRATING ORAL
Qty: 0 | Refills: 0 | DISCHARGE

## 2022-01-31 RX ADMIN — POLYETHYLENE GLYCOL 3350 17 GRAM(S): 17 POWDER, FOR SOLUTION ORAL at 11:19

## 2022-01-31 RX ADMIN — Medication 20 MILLIGRAM(S): at 11:19

## 2022-01-31 RX ADMIN — ENOXAPARIN SODIUM 40 MILLIGRAM(S): 100 INJECTION SUBCUTANEOUS at 11:19

## 2022-01-31 NOTE — DISCHARGE NOTE NURSING/CASE MANAGEMENT/SOCIAL WORK - PATIENT PORTAL LINK FT
You can access the FollowMyHealth Patient Portal offered by [facility] by registering at the following website: [email or web address]. By joining [place] [employer] FollowMyHealth portal, you will also be able to view your health information using other applications (apps) compatible with our system.

## 2022-01-31 NOTE — PROGRESS NOTE ADULT - SUBJECTIVE AND OBJECTIVE BOX
HPI:  81M with PMH of achalasia s/p PEG (planned for removal in Feb), now tolerating regular diet, presents s/p mechanical fall. Patient went to get his mail when he slipped and fell on his L side of the body. He hit his head however denies any loss of consciousness. No lightheadedness, dizziness, chest pain, palpitations, or shortness of breath prior to the fall. After the fall, patient started to have L shoulder pain. Patient walks with a walker. Patient denies any fevers or chills. No cough, shortness of breath, nausea, vomiting, abdominal pain, diarrhea, or dysuria. No melena or hematochezia.     In the ED, T is 98.5, HR 77, /83, RR 16 satting 99% on room air. Patient received tylenol 650mg X1.  (25 Jan 2022 20:59)    PAST MEDICAL & SURGICAL HISTORY:  Ganglion cyst of Left wrist    Eczema    Achalasia    H/O repair of left rotator cuff  RIGHT SHOULDER    PEG (percutaneous endoscopic gastrostomy) status      Medications:  acetaminophen     Tablet .. 650 milliGRAM(s) Oral every 6 hours PRN Mild Pain (1 - 3), Moderate Pain (4 - 6)  enoxaparin Injectable 40 milliGRAM(s) SubCutaneous daily  FLUoxetine 20 milliGRAM(s) Oral daily  influenza  Vaccine (HIGH DOSE) 0.7 milliLiter(s) IntraMuscular once  melatonin 3 milliGRAM(s) Oral at bedtime  oxyCODONE    IR 2.5 milliGRAM(s) Oral every 6 hours PRN Severe Pain (7 - 10)  polyethylene glycol 3350 17 Gram(s) Oral daily  senna 2 Tablet(s) Oral at bedtime    Labs:  CBC Full  -  ( 30 Jan 2022 13:39 )  WBC Count : 6.70 K/uL  Hemoglobin : 8.6 g/dL  Hematocrit : 27.5 %  Platelet Count - Automated : 190 K/uL  Mean Cell Volume : 68.4 fl  Mean Cell Hemoglobin : 21.4 pg  Mean Cell Hemoglobin Concentration : 31.3 gm/dL  Auto Neutrophil # : x  Auto Lymphocyte # : x  Auto Monocyte # : x  Auto Eosinophil # : x  Auto Basophil # : x  Auto Neutrophil % : x  Auto Lymphocyte % : x  Auto Monocyte % : x  Auto Eosinophil % : x  Auto Basophil % : x    01-30    137  |  101  |  24<H>  ----------------------------<  111<H>  4.3   |  24  |  0.60    Ca    9.1      30 Jan 2022 13:39        Radiology:             ROS:  Patient comfortable without distress  No SOB or chest pain  No palpitation  No abdominal pain, diarrhaea or constipation  No weakness of extremities  No skin changes or swelling of legs    Vital Signs Last 24 Hrs  T(C): 37 (31 Jan 2022 04:07), Max: 37.1 (30 Jan 2022 20:26)  T(F): 98.6 (31 Jan 2022 04:07), Max: 98.7 (30 Jan 2022 20:26)  HR: 67 (31 Jan 2022 04:07) (64 - 67)  BP: 104/62 (31 Jan 2022 04:07) (100/63 - 109/69)  BP(mean): --  RR: 18 (31 Jan 2022 04:07) (18 - 18)  SpO2: 96% (31 Jan 2022 04:07) (96% - 98%)    Physical exam:  Patient alert and oriented  No distress  CVS: S1, S2 regular or murmur  Chest: bilateral breath sound without rales  Abdomen: soft, not tender, no organomegaly or masses  No focal neuro deficit  No edema      Assessment and Plan: 81M with PMH of achalasia s/p PEG (planned for removal in Feb 2022), now tolerating regular diet, admitted 1/25/22 s/p mechanical fall. Patient went to get his mail when he slipped and fell on his L side of the body. He hit his head however denies any loss of consciousness. No lightheadedness, dizziness, chest pain, palpitations, or shortness of breath prior to the fall. After the fall, patient started to have L shoulder pain. Patient walks with a walker. Patient denies any fevers or chills. No cough, shortness of breath, nausea, vomiting, abdominal pain, diarrhea, or dysuria. No melena or hematochezia.   Seen for lytic lesion in skull and anemia  PAST MEDICAL & SURGICAL HISTORY:  Ganglion cyst of Left wrist    Eczema    Achalasia    H/O repair of left rotator cuff  RIGHT SHOULDER    PEG (percutaneous endoscopic gastrostomy) status      Medications:  acetaminophen     Tablet .. 650 milliGRAM(s) Oral every 6 hours PRN Mild Pain (1 - 3), Moderate Pain (4 - 6)  enoxaparin Injectable 40 milliGRAM(s) SubCutaneous daily  FLUoxetine 20 milliGRAM(s) Oral daily  influenza  Vaccine (HIGH DOSE) 0.7 milliLiter(s) IntraMuscular once  melatonin 3 milliGRAM(s) Oral at bedtime  oxyCODONE    IR 2.5 milliGRAM(s) Oral every 6 hours PRN Severe Pain (7 - 10)  polyethylene glycol 3350 17 Gram(s) Oral daily  senna 2 Tablet(s) Oral at bedtime    Labs:  CBC Full  -  ( 30 Jan 2022 13:39 )  WBC Count : 6.70 K/uL  Hemoglobin : 8.6 g/dL  Hematocrit : 27.5 %  Platelet Count - Automated : 190 K/uL  Mean Cell Volume : 68.4 fl  Mean Cell Hemoglobin : 21.4 pg  Mean Cell Hemoglobin Concentration : 31.3 gm/dL  Auto Neutrophil # : x  Auto Lymphocyte # : x  Auto Monocyte # : x  Auto Eosinophil # : x  Auto Basophil # : x  Auto Neutrophil % : x  Auto Lymphocyte % : x  Auto Monocyte % : x  Auto Eosinophil % : x  Auto Basophil % : x    01-30    137  |  101  |  24<H>  ----------------------------<  111<H>  4.3   |  24  |  0.60    Ca    9.1      30 Jan 2022 13:39        Radiology:             ROS:  Patient comfortable without distress  No SOB or chest pain  No palpitation  No abdominal pain, diarrhaea or constipation. Tolerating PO diet  No weakness of extremities  No skin changes or swelling of legs    Vital Signs Last 24 Hrs  T(C): 37 (31 Jan 2022 04:07), Max: 37.1 (30 Jan 2022 20:26)  T(F): 98.6 (31 Jan 2022 04:07), Max: 98.7 (30 Jan 2022 20:26)  HR: 67 (31 Jan 2022 04:07) (64 - 67)  BP: 104/62 (31 Jan 2022 04:07) (100/63 - 109/69)  BP(mean): --  RR: 18 (31 Jan 2022 04:07) (18 - 18)  SpO2: 96% (31 Jan 2022 04:07) (96% - 98%)    Physical exam:  Patient alert and oriented  No distress  CVS: S1, S2 regular or murmur  Chest: bilateral breath sound without rales  Abdomen: soft, not tender, no organomegaly or masses, removed PEG  No focal neuro deficit  No edema      Assessment and Plan:

## 2022-01-31 NOTE — PROGRESS NOTE ADULT - ASSESSMENT
A/P: 80yo Male h/o  achalasia s/p PEG (planned for removal in Feb), now tolerating regular diet, presents s/p mechanical fall found to have L humerus fracture.  Fall precautions,  pain control with prn oxycodone.    Lytic lesion of  calvarium. r/o myeloma. Lytic lesion seen involving the high left frontal calvarium on CTH  oncology dr dennis. cxr,  opacity/  ct chest,  minimal fluid.       Plan: Will stop IVF, advance diet, PEG removed. Discharge to rehab or home Monday.      Spoke to wife about rehab Friday.      A/P: 80yo Male h/o  achalasia s/p PEG (planned for removal in Feb), now tolerating regular diet, presents s/p mechanical fall found to have L humerus fracture.  Fall precautions,  pain control with prn oxycodone.    Lytic lesion of  calvarium. r/o myeloma. Lytic lesion seen involving the high left frontal calvarium on CTH  oncology dr dennis. cxr,  opacity/  ct chest,  minimal fluid.         Plan: Will stop IVF, advance diet, PEG removed. Discharge to rehab or home Monday.      Spoke to wife about rehab Friday.  Family and wife to decide today regarding discharge plan. Wife is [phone].

## 2022-01-31 NOTE — PROGRESS NOTE ADULT - SUBJECTIVE AND OBJECTIVE BOX
[facility]  Allan Dale PA-C  [facility] [place] [address]  [phone]      INTERVAL HPI/OVERNIGHT EVENTS:  pt seen and examined earlier this am, no complaints   tolerating PO diet, good appetite     MEDICATIONS  (STANDING):  enoxaparin Injectable 40 milliGRAM(s) SubCutaneous daily  FLUoxetine 20 milliGRAM(s) Oral daily  melatonin 3 milliGRAM(s) Oral at bedtime  polyethylene glycol 3350 17 Gram(s) Oral daily  sodium chloride 0.9%. 1000 milliLiter(s) (70 mL/Hr) IV Continuous <Continuous>    MEDICATIONS  (PRN):  acetaminophen     Tablet .. 650 milliGRAM(s) Oral every 6 hours PRN Mild Pain (1 - 3), Moderate Pain (4 - 6)  oxyCODONE    IR 2.5 milliGRAM(s) Oral every 6 hours PRN Severe Pain (7 - 10)      Allergies    No Known Allergies    Intolerances        ROS:   General:  No wt loss, fevers, chills, night sweats, fatigue,   Eyes:  Good vision, no reported pain  ENT:  No sore throat, pain, runny nose, dysphagia  CV:  No pain, palpitations, hypo/hypertension  Resp:  No dyspnea, cough, tachypnea, wheezing  GI:  No pain, No nausea, No vomiting, No diarrhea, No constipation, No weight loss, No fever, No pruritis, No rectal bleeding, No tarry stools, No dysphagia,  :  No pain, bleeding, incontinence, nocturia  Muscle:  + pain, weakness  Neuro:  No weakness, tingling, memory problems  Psych:  No fatigue, insomnia, mood problems, depression  Endocrine:  No polyuria, polydipsia, cold/heat intolerance  Heme:  No petechiae, ecchymosis, easy bruisability  Skin:  No rash, tattoos, scars, edema      PHYSICAL EXAM:   Vital Signs Last 24 Hrs  T(C): 37 (31 Jan 2022 04:07), Max: 37.1 (30 Jan 2022 20:26)  T(F): 98.6 (31 Jan 2022 04:07), Max: 98.7 (30 Jan 2022 20:26)  HR: 67 (31 Jan 2022 04:07) (67 - 67)  BP: 104/62 (31 Jan 2022 04:07) (100/63 - 104/62)  BP(mean): --  RR: 18 (31 Jan 2022 04:07) (18 - 18)  SpO2: 96% (31 Jan 2022 04:07) (96% - 97%)  Daily     Daily     GENERAL:  Appears stated age,   HEENT:  NC/AT,    CHEST:  Full & symmetric excursion,   HEART:  Regular rhythm,  ABDOMEN:  Soft, non-tender, non-distended,  EXTEREMITIES:  no cyanosis, sling in place left arm   SKIN:  No rash  NEURO:  Alert,       LABS:                                   8.6    6.70  )-----------( 190      ( 30 Jan 2022 13:39 )             27.5   01-30    137  |  101  |  24<H>  ----------------------------<  111<H>  4.3   |  24  |  0.60    Ca    9.1      30 Jan 2022 13:39            RADIOLOGY & ADDITIONAL TESTS:

## 2022-01-31 NOTE — PROGRESS NOTE ADULT - REASON FOR ADMISSION
fall, humerus fracture

## 2022-01-31 NOTE — DISCHARGE NOTE NURSING/CASE MANAGEMENT/SOCIAL WORK - NSDCPEFALRISK_GEN_ALL_CORE
For information on Fall & Injury Prevention, visit: [email or web address] OR  [email or web address] OR  https://www.cdc.gov/steadi/patient.html

## 2022-01-31 NOTE — PROGRESS NOTE ADULT - ASSESSMENT
81M with PMH of achalasia s/p PEG (planned for removal in Feb 2022), now tolerating regular diet, admitted 1/25/22 s/p mechanical fall. Patient went to get his mail when he slipped and fell on his L side of the body. He hit his head however denies any loss of consciousness. No lightheadedness, dizziness, chest pain, palpitations, or shortness of breath prior to the fall. After the fall, patient started to have L shoulder pain. Patient walks with a walker. Patient denies any fevers or chills. No cough, shortness of breath, nausea, vomiting, abdominal pain, diarrhea, or dysuria. No melena or hematochezia.   Seen for lytic lesion in skull with diff as above, MRI can be done to better define lesion, and anemia    He had 9/29/21 laproscopic J tube placement, Had SBO due to kinked segment of small bowel. He needed laproscopic realignment of J tube. He needed venting G tube as well in 10/2021 .  He has been tolerating oral meals since Dec 2021 and G tube had been clamped with plan to remove it.  Review on labs indicated chronic anemia. No increase in creatinine or calcium  He was noted to have comminuted fracture of proximal humerus after fall seen by ortho. CT head showed expansile lytic lesion seen involving the high   left frontal calvarium.   No other lytic lesions have been noted, making paraprotenemia as less likely etiology.  I had ordered paraproteinemia evaluation to evaluate further, so far free light chain ratio is normal. Immunofixation serum is pending, urine normal  He does not have iron or B12 def to explain chronic anemia.   HBEP shows Beta Thal trait, however his anemia is too severe may be from component of chronic disease added. He will need out patient follow up

## 2022-01-31 NOTE — PROGRESS NOTE ADULT - SUBJECTIVE AND OBJECTIVE BOX
INTERVAL HPI/OVERNIGHT EVENTS:  Pt seen and examined at bedside.     Allergies/Intolerance: No Known Allergies      MEDICATIONS  (STANDING):  enoxaparin Injectable 40 milliGRAM(s) SubCutaneous daily  FLUoxetine 20 milliGRAM(s) Oral daily  influenza  Vaccine (HIGH DOSE) 0.7 milliLiter(s) IntraMuscular once  melatonin 3 milliGRAM(s) Oral at bedtime  polyethylene glycol 3350 17 Gram(s) Oral daily  senna 2 Tablet(s) Oral at bedtime    MEDICATIONS  (PRN):  acetaminophen     Tablet .. 650 milliGRAM(s) Oral every 6 hours PRN Mild Pain (1 - 3), Moderate Pain (4 - 6)  oxyCODONE    IR 2.5 milliGRAM(s) Oral every 6 hours PRN Severe Pain (7 - 10)        ROS: all systems reviewed and wnl      PHYSICAL EXAMINATION:  Vital Signs Last 24 Hrs  T(C): 37 (31 Jan 2022 04:07), Max: 37.1 (30 Jan 2022 20:26)  T(F): 98.6 (31 Jan 2022 04:07), Max: 98.7 (30 Jan 2022 20:26)  HR: 67 (31 Jan 2022 04:07) (64 - 67)  BP: 104/62 (31 Jan 2022 04:07) (100/63 - 109/69)  BP(mean): --  RR: 18 (31 Jan 2022 04:07) (18 - 18)  SpO2: 96% (31 Jan 2022 04:07) (96% - 98%)  CAPILLARY BLOOD GLUCOSE          01-30 @ 07:01  -  01-31 @ 07:00  --------------------------------------------------------  IN: 450 mL / OUT: 1700 mL / NET: -1250 mL        GENERAL: stable on RA, no new complaints, sling in right arm   NECK: supple, No JVD  CHEST/LUNG: clear to auscultation bilaterally; no rales, rhonchi, or wheezing b/l  HEART: normal S1, S2  ABDOMEN: BS+, soft, ND, NT   EXTREMITIES:  pulses palpable; no clubbing, cyanosis, or edema b/l LEs  SKIN: no rashes or lesions      LABS:                        8.6    6.70  )-----------( 190      ( 30 Jan 2022 13:39 )             27.5     01-30    137  |  101  |  24<H>  ----------------------------<  111<H>  4.3   |  24  |  0.60    Ca    9.1      30 Jan 2022 13:39

## 2022-01-31 NOTE — DISCHARGE NOTE NURSING/CASE MANAGEMENT/SOCIAL WORK - NSSCNAMETXT_GEN_ALL_CORE
Pt was seen today in [facility] for a Phase 2 visit.  Vital signs and session notes recorded in [employer] and will be scanned into Epic by HIM.  
[facility]

## 2022-01-31 NOTE — CHART NOTE - NSCHARTNOTEFT_GEN_A_CORE
Pt medically cleared for discharge by Dr. Mejia. Wife at bedside would like to take patient home. Continue current medications, follow up with Ortho, heme and PMD

## 2022-02-01 LAB
% GAMMA, URINE: 8.8 % — SIGNIFICANT CHANGE UP
ALBUMIN 24H MFR UR ELPH: 20.6 % — SIGNIFICANT CHANGE UP
ALPHA1 GLOB 24H MFR UR ELPH: 34.4 % — SIGNIFICANT CHANGE UP
ALPHA2 GLOB 24H MFR UR ELPH: 17.7 % — SIGNIFICANT CHANGE UP
B-GLOBULIN 24H MFR UR ELPH: 18.5 % — SIGNIFICANT CHANGE UP
COLLECT DURATION TIME UR: 24 HR — SIGNIFICANT CHANGE UP
INTERPRETATION 24H UR IFE-IMP: SIGNIFICANT CHANGE UP
INTERPRETATION 24H UR IFE-IMP: SIGNIFICANT CHANGE UP
M PROTEIN 24H UR ELPH-MRATE: 0 MG/24HR — SIGNIFICANT CHANGE UP (ref 0–0)
M PROTEIN 24H UR ELPH-MRATE: 0 MG/DL — SIGNIFICANT CHANGE UP
PROT ?TM UR-MCNC: 17 MG/DL — HIGH (ref 0–12)
PROT PATTERN 24H UR ELPH-IMP: SIGNIFICANT CHANGE UP
PROTEIN QUANT CALC, URINE: 187 MG/24 H — HIGH (ref 50–100)
TOTAL VOLUME - 24 HOUR: 1100 ML — SIGNIFICANT CHANGE UP
URINE CREATININE CALCULATION: 1.6 G/24 H — SIGNIFICANT CHANGE UP (ref 1–2)

## 2022-02-02 LAB — INTERPRETATION SERPL IFE-IMP: SIGNIFICANT CHANGE UP

## 2022-02-14 ENCOUNTER — APPOINTMENT (OUTPATIENT)
Dept: ORTHOPEDIC SURGERY | Facility: CLINIC | Age: 82
End: 2022-02-14
Payer: MEDICARE

## 2022-02-17 ENCOUNTER — APPOINTMENT (OUTPATIENT)
Dept: ORTHOPEDIC SURGERY | Facility: CLINIC | Age: 82
End: 2022-02-17
Payer: MEDICARE

## 2022-02-17 VITALS — WEIGHT: 174 LBS | BODY MASS INDEX: 24.91 KG/M2 | HEIGHT: 70 IN

## 2022-02-17 DIAGNOSIS — S42.202A UNSPECIFIED FRACTURE OF UPPER END OF LEFT HUMERUS, INITIAL ENCOUNTER FOR CLOSED FRACTURE: ICD-10-CM

## 2022-02-17 PROCEDURE — 99024 POSTOP FOLLOW-UP VISIT: CPT

## 2022-02-17 PROCEDURE — 73030 X-RAY EXAM OF SHOULDER: CPT | Mod: LT

## 2022-02-17 NOTE — END OF VISIT
[FreeTextEntry3] : All medical record entries made by the Scribe were at my,  Dr. Peyman Hernandez MD., direction and personally dictated by me on 02/17/2022. I have personally reviewed the chart and agree that the record accurately reflects my personal performance of the history, physical exam, assessment and plan.

## 2022-02-17 NOTE — PHYSICAL EXAM
[de-identified] : Patient is WDWN, alert, and in no acute distress. Breathing is unlabored. He is grossly oriented to person, place, and time.\par \par He is accompanied by his wife today\par \par Left Upper Extremity:\par He presents in an arm sling which was in place for the physical exam\par Anterior deformity present with prominence and mild erythema\par ecchymosis present\par passive shoulder flexion 0-75\par Full digital motion present with normal sensation\par Full wrist motion into flexion and extension. [de-identified] : AP, transcapula, and axillary views of the LEFT shoulder were obtained and revealed a comminuted proximal humerus fracture with apex angulation. 	 \par ------------------------------------------------------------------------------------------------------------------------------------------------------------------------------------\par \par EXAM: XR SHOULDER AXILLARY 1 VIEW LT\par PROCEDURE DATE: 01/25/2022\par IMPRESSION:\par Comminuted proximal humerus fracture at the level of the surgical neck with severe apex anterior angulation.\par \par GRISELDA PETERSON MD; Resident Radiology\par This document has been electronically signed.\par ANUJ MABRY MD; Attending Radiologist\par This document has been electronically signed. Jan 25 2022 7:27PM

## 2022-02-17 NOTE — ADDENDUM
[FreeTextEntry1] : I, Leah Orellana wrote this note acting as a scribe for Dr. Peyman Hernandez on Feb 17, 2022.

## 2022-02-17 NOTE — HISTORY OF PRESENT ILLNESS
[de-identified] : Pt is an 80 y/o male with left proximal humerus fracture.  He slipped and fell while picking up the mail in his home.  He landed on his left side.  He had pain immediately.  He went to [facility] ED where xrays revealed a left proximal humerus fracture.  A sling was applied.  He has been recovering at home with home care.  He states that the pain is improving.  \par \par One month prior to the injury he had been discharged from rehab after having achalasia.  He was treated with a feeding tube and TPN.  He has recovered from this and his wife states that he is clear to receive Ibuprofen.

## 2022-02-17 NOTE — DISCUSSION/SUMMARY
[de-identified] : The underlying pathophysiology was reviewed with the patient. XR films were reviewed with the patient. Discussed at length the nature of the patient’s condition. The left upper extremity symptoms appear secondary to Comminuted proximal humerus fracture at the level of the surgical neck with severe apex anterior angulation.\par \par Treatment recommendations of operative versus nonoperative management were discussed. I did discuss with him and his wife that I am concerned about the notable deformity to the shoulder and prominence  anteriorly. As such, I am referring him for a CT scan with 3D-reconstruction for left shoulder, evaluate left humeral farcture alignment. A script was given and he will follow up to review further treatment recommendations. \par With regard to PT/OT, I did discuss with him and his wife that I typically do not recommend that my patient's attend physical therapy at this stage in their recovery. I am recommending that he discontinue OT/PT in the interim.\par Finally, I did recommend he try to use the upper extremity for ADLs as tolerated and actively perform ROM exercises of the wrist and hand as to not lose motion in the long term. \par \par All questions answered, understanding verbalized. Patient in agreement with plan of care.

## 2022-02-18 ENCOUNTER — OUTPATIENT (OUTPATIENT)
Dept: OUTPATIENT SERVICES | Facility: HOSPITAL | Age: 82
LOS: 1 days | End: 2022-02-18
Payer: MEDICARE

## 2022-02-18 ENCOUNTER — APPOINTMENT (OUTPATIENT)
Dept: CT IMAGING | Facility: IMAGING CENTER | Age: 82
End: 2022-02-18
Payer: MEDICARE

## 2022-02-18 ENCOUNTER — RESULT REVIEW (OUTPATIENT)
Age: 82
End: 2022-02-18

## 2022-02-18 DIAGNOSIS — S42.202A UNSPECIFIED FRACTURE OF UPPER END OF LEFT HUMERUS, INITIAL ENCOUNTER FOR CLOSED FRACTURE: ICD-10-CM

## 2022-02-18 DIAGNOSIS — Z93.1 GASTROSTOMY STATUS: Chronic | ICD-10-CM

## 2022-02-18 PROCEDURE — 76377 3D RENDER W/INTRP POSTPROCES: CPT | Mod: 26

## 2022-02-18 PROCEDURE — 76377 3D RENDER W/INTRP POSTPROCES: CPT

## 2022-02-18 PROCEDURE — 73200 CT UPPER EXTREMITY W/O DYE: CPT | Mod: 26,LT,MH

## 2022-02-18 PROCEDURE — 73200 CT UPPER EXTREMITY W/O DYE: CPT

## 2022-02-23 ENCOUNTER — OUTPATIENT (OUTPATIENT)
Dept: OUTPATIENT SERVICES | Facility: HOSPITAL | Age: 82
LOS: 1 days | End: 2022-02-23

## 2022-02-23 VITALS
RESPIRATION RATE: 14 BRPM | WEIGHT: 175.05 LBS | HEIGHT: 70 IN | DIASTOLIC BLOOD PRESSURE: 70 MMHG | TEMPERATURE: 97 F | HEART RATE: 68 BPM | SYSTOLIC BLOOD PRESSURE: 102 MMHG | OXYGEN SATURATION: 98 %

## 2022-02-23 DIAGNOSIS — R94.31 ABNORMAL ELECTROCARDIOGRAM [ECG] [EKG]: ICD-10-CM

## 2022-02-23 DIAGNOSIS — Z98.890 OTHER SPECIFIED POSTPROCEDURAL STATES: Chronic | ICD-10-CM

## 2022-02-23 DIAGNOSIS — K22.2 ESOPHAGEAL OBSTRUCTION: Chronic | ICD-10-CM

## 2022-02-23 DIAGNOSIS — R29.898 OTHER SYMPTOMS AND SIGNS INVOLVING THE MUSCULOSKELETAL SYSTEM: ICD-10-CM

## 2022-02-23 DIAGNOSIS — S42.309A UNSPECIFIED FRACTURE OF SHAFT OF HUMERUS, UNSPECIFIED ARM, INITIAL ENCOUNTER FOR CLOSED FRACTURE: ICD-10-CM

## 2022-02-23 DIAGNOSIS — Z01.818 ENCOUNTER FOR OTHER PREPROCEDURAL EXAMINATION: ICD-10-CM

## 2022-02-23 DIAGNOSIS — Z93.1 GASTROSTOMY STATUS: Chronic | ICD-10-CM

## 2022-02-23 LAB
ALBUMIN SERPL ELPH-MCNC: 3.6 G/DL — SIGNIFICANT CHANGE UP (ref 3.3–5)
ALP SERPL-CCNC: 111 U/L — SIGNIFICANT CHANGE UP (ref 30–120)
ALT FLD-CCNC: 23 U/L DA — SIGNIFICANT CHANGE UP (ref 10–60)
ANION GAP SERPL CALC-SCNC: 8 MMOL/L — SIGNIFICANT CHANGE UP (ref 5–17)
AST SERPL-CCNC: 21 U/L — SIGNIFICANT CHANGE UP (ref 10–40)
BILIRUB SERPL-MCNC: 0.5 MG/DL — SIGNIFICANT CHANGE UP (ref 0.2–1.2)
BUN SERPL-MCNC: 24 MG/DL — HIGH (ref 7–23)
CALCIUM SERPL-MCNC: 8.5 MG/DL — SIGNIFICANT CHANGE UP (ref 8.4–10.5)
CHLORIDE SERPL-SCNC: 103 MMOL/L — SIGNIFICANT CHANGE UP (ref 96–108)
CO2 SERPL-SCNC: 27 MMOL/L — SIGNIFICANT CHANGE UP (ref 22–31)
CREAT SERPL-MCNC: 0.73 MG/DL — SIGNIFICANT CHANGE UP (ref 0.5–1.3)
GLUCOSE SERPL-MCNC: 116 MG/DL — HIGH (ref 70–99)
HCT VFR BLD CALC: 32.2 % — LOW (ref 39–50)
HGB BLD-MCNC: 10.1 G/DL — LOW (ref 13–17)
MCHC RBC-ENTMCNC: 21.8 PG — LOW (ref 27–34)
MCHC RBC-ENTMCNC: 31.4 GM/DL — LOW (ref 32–36)
MCV RBC AUTO: 69.4 FL — LOW (ref 80–100)
NRBC # BLD: 0 /100 WBCS — SIGNIFICANT CHANGE UP (ref 0–0)
PLATELET # BLD AUTO: 219 K/UL — SIGNIFICANT CHANGE UP (ref 150–400)
POTASSIUM SERPL-MCNC: 4.4 MMOL/L — SIGNIFICANT CHANGE UP (ref 3.5–5.3)
POTASSIUM SERPL-SCNC: 4.4 MMOL/L — SIGNIFICANT CHANGE UP (ref 3.5–5.3)
PROT SERPL-MCNC: 7 G/DL — SIGNIFICANT CHANGE UP (ref 6–8.3)
RBC # BLD: 4.64 M/UL — SIGNIFICANT CHANGE UP (ref 4.2–5.8)
RBC # FLD: 15.9 % — HIGH (ref 10.3–14.5)
SARS-COV-2 RNA SPEC QL NAA+PROBE: SIGNIFICANT CHANGE UP
SODIUM SERPL-SCNC: 138 MMOL/L — SIGNIFICANT CHANGE UP (ref 135–145)
WBC # BLD: 5.8 K/UL — SIGNIFICANT CHANGE UP (ref 3.8–10.5)
WBC # FLD AUTO: 5.8 K/UL — SIGNIFICANT CHANGE UP (ref 3.8–10.5)

## 2022-02-23 NOTE — H&P PST ADULT - PROBLEM SELECTOR PROBLEM 2
Abnormal EKG If an upper endoscopy or enteroscopy was performed, you might have a sore throat for 1 to 2 days after the procedure. If it does not improve, please contact your doctor.

## 2022-02-23 NOTE — H&P PST ADULT - NSANTHTOTALSCORECAL_ENT_A_CORE
Impression: Age-related nuclear cataract, bilateral: H25.13. Condition: will continue to monitor. Plan: Discussed diagnosis in detail with patient. No treatment is required at this time. Discussed risks of progression. Will continue to observe condition and or symptoms. Call if [address] worsens. 2

## 2022-02-23 NOTE — H&P PST ADULT - PROBLEM SELECTOR PLAN 1
ORIF left proximal humerus, covid swab today, preop instructions given, to go today for medical clearance

## 2022-02-23 NOTE — H&P PST ADULT - HISTORY OF PRESENT ILLNESS
this is a 80 y/o male  who fell and fractured left humerus on 1/25/22; patient was hospitalized for 5 days and no problems were found relating to the cause of the fall; patient is wearing a sling, to have repair of fracture

## 2022-02-23 NOTE — H&P PST ADULT - HISTORY OF COVID-19 VACCINATION
The patient has been re-examined and I agree with the above assessment or I updated with my findings.
Yes

## 2022-02-23 NOTE — H&P PST ADULT - NSICDXFAMILYHX_GEN_ALL_CORE_FT
FAMILY HISTORY:  Mother  Still living? No  Family history of breast cancer in mother, Age at diagnosis: Age Unknown  Family history of cancer of gallbladder, Age at diagnosis: Age Unknown

## 2022-02-23 NOTE — H&P PST ADULT - NSICDXPASTSURGICALHX_GEN_ALL_CORE_FT
PAST SURGICAL HISTORY:  Esophageal obstruction had multiple surgeries and procedures    PEG (percutaneous endoscopic gastrostomy) status and removed    S/P arthroscopy of right shoulder

## 2022-02-23 NOTE — H&P PST ADULT - NSICDXPASTMEDICALHX_GEN_ALL_CORE_FT
PAST MEDICAL HISTORY:  Achalasia resolved with surgeries and procedures    Eczema     Ganglion cyst of Left wrist

## 2022-02-24 ENCOUNTER — TRANSCRIPTION ENCOUNTER (OUTPATIENT)
Age: 82
End: 2022-02-24

## 2022-02-25 ENCOUNTER — TRANSCRIPTION ENCOUNTER (OUTPATIENT)
Age: 82
End: 2022-02-25

## 2022-02-25 ENCOUNTER — INPATIENT (INPATIENT)
Facility: HOSPITAL | Age: 82
LOS: 0 days | Discharge: HOME | DRG: 494 | End: 2022-02-26
Attending: SPECIALIST | Admitting: SPECIALIST
Payer: COMMERCIAL

## 2022-02-25 ENCOUNTER — APPOINTMENT (OUTPATIENT)
Dept: ORTHOPEDIC SURGERY | Facility: HOSPITAL | Age: 82
End: 2022-02-25

## 2022-02-25 VITALS
WEIGHT: 172.62 LBS | DIASTOLIC BLOOD PRESSURE: 69 MMHG | SYSTOLIC BLOOD PRESSURE: 123 MMHG | RESPIRATION RATE: 12 BRPM | TEMPERATURE: 98 F | HEIGHT: 70 IN | OXYGEN SATURATION: 98 % | HEART RATE: 64 BPM

## 2022-02-25 DIAGNOSIS — K22.2 ESOPHAGEAL OBSTRUCTION: Chronic | ICD-10-CM

## 2022-02-25 DIAGNOSIS — Z93.1 GASTROSTOMY STATUS: Chronic | ICD-10-CM

## 2022-02-25 DIAGNOSIS — Z98.890 OTHER SPECIFIED POSTPROCEDURAL STATES: Chronic | ICD-10-CM

## 2022-02-25 DIAGNOSIS — S42.202A UNSPECIFIED FRACTURE OF UPPER END OF LEFT HUMERUS, INITIAL ENCOUNTER FOR CLOSED FRACTURE: ICD-10-CM

## 2022-02-25 LAB
ABO RH CONFIRMATION: SIGNIFICANT CHANGE UP
ABO RH CONFIRMATION: SIGNIFICANT CHANGE UP
BLD GP AB SCN SERPL QL: SIGNIFICANT CHANGE UP

## 2022-02-25 PROCEDURE — 23615 OPTX PROX HUMRL FX W/INT FIX: CPT | Mod: 58,LT

## 2022-02-25 DEVICE — SCREW LOKG SLF-TPNG W/ STARDRIVE RECESS 3.5X60MM: Type: IMPLANTABLE DEVICE | Status: FUNCTIONAL

## 2022-02-25 DEVICE — SCREW LOKG SLF-TPNG W/ STARDRIVE RECESS 3.5X50MM: Type: IMPLANTABLE DEVICE | Status: FUNCTIONAL

## 2022-02-25 DEVICE — SCREW CORT S-T 3.5X32MM: Type: IMPLANTABLE DEVICE | Status: FUNCTIONAL

## 2022-02-25 DEVICE — IMPLANTABLE DEVICE: Type: IMPLANTABLE DEVICE | Status: FUNCTIONAL

## 2022-02-25 DEVICE — SCREW LOKG SLF-TPNG W/ STARDRIVE RECESS 3.5X26MM: Type: IMPLANTABLE DEVICE | Status: FUNCTIONAL

## 2022-02-25 DEVICE — SCREW LOKG SLF-TPNG W/ STARDRIVE RECESS 3.5X46MM: Type: IMPLANTABLE DEVICE | Status: FUNCTIONAL

## 2022-02-25 DEVICE — SCREW LOKG SLF-TPNG W/ STARDRIVE RECESS 3.5X32MM: Type: IMPLANTABLE DEVICE | Status: FUNCTIONAL

## 2022-02-25 DEVICE — SCREW LOKG SLF-TPNG W/ STARDRIVE RECESS 3.5X35MM: Type: IMPLANTABLE DEVICE | Status: FUNCTIONAL

## 2022-02-25 DEVICE — WIRE K THRD 1.6X150MM: Type: IMPLANTABLE DEVICE | Status: FUNCTIONAL

## 2022-02-25 DEVICE — SCREW LOKG S-T 3.5X54MM: Type: IMPLANTABLE DEVICE | Status: FUNCTIONAL

## 2022-02-25 DEVICE — PLATE LCP PROX HUM STD 5H 3.5X114MM: Type: IMPLANTABLE DEVICE | Status: FUNCTIONAL

## 2022-02-25 DEVICE — SCREW LOCKING SLF-TPNG W/ STARDRIVE RECESS 3.5X40MM: Type: IMPLANTABLE DEVICE | Status: FUNCTIONAL

## 2022-02-25 DEVICE — SCREW CORT S-T 3.5X34MM: Type: IMPLANTABLE DEVICE | Status: FUNCTIONAL

## 2022-02-25 DEVICE — SCREW LOKG SLF-TPNG W/ STARDRIVE RECESS 3.5X34MM: Type: IMPLANTABLE DEVICE | Status: FUNCTIONAL

## 2022-02-25 DEVICE — SCREW LOKG SLF-TPNG W/ STARDRIVE RECESS 3.5X48MM: Type: IMPLANTABLE DEVICE | Status: FUNCTIONAL

## 2022-02-25 DEVICE — SPONGE HSAT SURGICEL 6"X9": Type: IMPLANTABLE DEVICE | Status: FUNCTIONAL

## 2022-02-25 RX ORDER — CELECOXIB 200 MG/1
200 CAPSULE ORAL EVERY 12 HOURS
Refills: 0 | Status: DISCONTINUED | OUTPATIENT
Start: 2022-02-25 | End: 2022-02-26

## 2022-02-25 RX ORDER — CEFAZOLIN SODIUM 1 G
2000 VIAL (EA) INJECTION ONCE
Refills: 0 | Status: COMPLETED | OUTPATIENT
Start: 2022-02-25 | End: 2022-02-25

## 2022-02-25 RX ORDER — CEFAZOLIN SODIUM 1 G
2000 VIAL (EA) INJECTION EVERY 8 HOURS
Refills: 0 | Status: COMPLETED | OUTPATIENT
Start: 2022-02-26 | End: 2022-02-26

## 2022-02-25 RX ORDER — POLYETHYLENE GLYCOL 3350 17 G/17G
17 POWDER, FOR SOLUTION ORAL
Qty: 0 | Refills: 0 | DISCHARGE

## 2022-02-25 RX ORDER — ONDANSETRON 8 MG/1
4 TABLET, FILM COATED ORAL EVERY 6 HOURS
Refills: 0 | Status: DISCONTINUED | OUTPATIENT
Start: 2022-02-25 | End: 2022-02-26

## 2022-02-25 RX ORDER — OXYCODONE HYDROCHLORIDE 5 MG/1
5 TABLET ORAL EVERY 4 HOURS
Refills: 0 | Status: DISCONTINUED | OUTPATIENT
Start: 2022-02-25 | End: 2022-02-26

## 2022-02-25 RX ORDER — OXYCODONE AND ACETAMINOPHEN 5; 325 MG/1; MG/1
1 TABLET ORAL ONCE
Refills: 0 | Status: DISCONTINUED | OUTPATIENT
Start: 2022-02-25 | End: 2022-02-25

## 2022-02-25 RX ORDER — DOCUSATE SODIUM 100 MG
1 CAPSULE ORAL
Qty: 0 | Refills: 0 | DISCHARGE

## 2022-02-25 RX ORDER — MULTIVIT-MIN/FERROUS GLUCONATE 9 MG/15 ML
1 LIQUID (ML) ORAL
Qty: 0 | Refills: 0 | DISCHARGE

## 2022-02-25 RX ORDER — SODIUM CHLORIDE 9 MG/ML
1000 INJECTION, SOLUTION INTRAVENOUS
Refills: 0 | Status: DISCONTINUED | OUTPATIENT
Start: 2022-02-25 | End: 2022-02-26

## 2022-02-25 RX ORDER — FLUOXETINE HCL 10 MG
1 CAPSULE ORAL
Qty: 0 | Refills: 0 | DISCHARGE

## 2022-02-25 RX ORDER — CHLORHEXIDINE GLUCONATE 213 G/1000ML
1 SOLUTION TOPICAL ONCE
Refills: 0 | Status: COMPLETED | OUTPATIENT
Start: 2022-02-25 | End: 2022-02-25

## 2022-02-25 RX ORDER — PREGABALIN 225 MG/1
1 CAPSULE ORAL
Qty: 0 | Refills: 0 | DISCHARGE

## 2022-02-25 RX ORDER — HYDROMORPHONE HYDROCHLORIDE 2 MG/ML
0.5 INJECTION INTRAMUSCULAR; INTRAVENOUS; SUBCUTANEOUS EVERY 4 HOURS
Refills: 0 | Status: DISCONTINUED | OUTPATIENT
Start: 2022-02-25 | End: 2022-02-26

## 2022-02-25 RX ORDER — HYDROMORPHONE HYDROCHLORIDE 2 MG/ML
0.5 INJECTION INTRAMUSCULAR; INTRAVENOUS; SUBCUTANEOUS
Refills: 0 | Status: DISCONTINUED | OUTPATIENT
Start: 2022-02-25 | End: 2022-02-25

## 2022-02-25 RX ORDER — APREPITANT 80 MG/1
40 CAPSULE ORAL ONCE
Refills: 0 | Status: COMPLETED | OUTPATIENT
Start: 2022-02-25 | End: 2022-02-25

## 2022-02-25 RX ORDER — ACETAMINOPHEN 500 MG
1000 TABLET ORAL EVERY 8 HOURS
Refills: 0 | Status: DISCONTINUED | OUTPATIENT
Start: 2022-02-26 | End: 2022-02-26

## 2022-02-25 RX ORDER — ONDANSETRON 8 MG/1
4 TABLET, FILM COATED ORAL ONCE
Refills: 0 | Status: DISCONTINUED | OUTPATIENT
Start: 2022-02-25 | End: 2022-02-25

## 2022-02-25 RX ORDER — SODIUM CHLORIDE 9 MG/ML
1000 INJECTION, SOLUTION INTRAVENOUS
Refills: 0 | Status: DISCONTINUED | OUTPATIENT
Start: 2022-02-25 | End: 2022-02-25

## 2022-02-25 RX ORDER — HYDROMORPHONE HYDROCHLORIDE 2 MG/ML
0.5 INJECTION INTRAMUSCULAR; INTRAVENOUS; SUBCUTANEOUS EVERY 4 HOURS
Refills: 0 | Status: DISCONTINUED | OUTPATIENT
Start: 2022-02-25 | End: 2022-02-25

## 2022-02-25 RX ORDER — FLUOXETINE HCL 10 MG
20 CAPSULE ORAL DAILY
Refills: 0 | Status: DISCONTINUED | OUTPATIENT
Start: 2022-02-25 | End: 2022-02-26

## 2022-02-25 RX ORDER — TAMSULOSIN HYDROCHLORIDE 0.4 MG/1
0.4 CAPSULE ORAL AT BEDTIME
Refills: 0 | Status: DISCONTINUED | OUTPATIENT
Start: 2022-02-25 | End: 2022-02-26

## 2022-02-25 RX ORDER — ACETAMINOPHEN 500 MG
1000 TABLET ORAL ONCE
Refills: 0 | Status: COMPLETED | OUTPATIENT
Start: 2022-02-25 | End: 2022-02-25

## 2022-02-25 RX ORDER — TAMSULOSIN HYDROCHLORIDE 0.4 MG/1
1 CAPSULE ORAL
Qty: 0 | Refills: 0 | DISCHARGE

## 2022-02-25 RX ORDER — ASPIRIN/CALCIUM CARB/MAGNESIUM 324 MG
81 TABLET ORAL EVERY 12 HOURS
Refills: 0 | Status: DISCONTINUED | OUTPATIENT
Start: 2022-02-26 | End: 2022-02-26

## 2022-02-25 RX ORDER — OXYCODONE HYDROCHLORIDE 5 MG/1
10 TABLET ORAL EVERY 4 HOURS
Refills: 0 | Status: DISCONTINUED | OUTPATIENT
Start: 2022-02-25 | End: 2022-02-26

## 2022-02-25 RX ADMIN — TAMSULOSIN HYDROCHLORIDE 0.4 MILLIGRAM(S): 0.4 CAPSULE ORAL at 22:53

## 2022-02-25 RX ADMIN — APREPITANT 40 MILLIGRAM(S): 80 CAPSULE ORAL at 13:33

## 2022-02-25 RX ADMIN — Medication 1000 MILLIGRAM(S): at 21:49

## 2022-02-25 RX ADMIN — CHLORHEXIDINE GLUCONATE 1 APPLICATION(S): 213 SOLUTION TOPICAL at 13:33

## 2022-02-25 RX ADMIN — Medication 400 MILLIGRAM(S): at 21:15

## 2022-02-25 RX ADMIN — SODIUM CHLORIDE 75 MILLILITER(S): 9 INJECTION, SOLUTION INTRAVENOUS at 20:39

## 2022-02-25 NOTE — DISCHARGE NOTE PROVIDER - NSDCFUADDINST_GEN_ALL_CORE_FT
- Call your doctor if you experience:  • An increase in pain not controlled by pain medication or change in activity or  position.  • Temperature greater than 101° F.  • Redness, increased swelling or foul smelling drainage from or around the  incision.  • Numbness, tingling or a change in color or temperature of the operative leg.  • Call your doctor immediately if you experience chest pain, shortness of breath or calf pain.  For Constipation :   • Increase your water intake. Drink at least 8 glasses of water daily.  • Try adding fiber to your diet by eating fruits, vegetables and foods that are rich in grains.  • If you do experience constipation, you may take an over-the-counter stool softener/laxative such as Denise Colace, Sen[place] or  Milk of Magnesia.

## 2022-02-25 NOTE — DISCHARGE NOTE PROVIDER - NSDCCPTREATMENT_GEN_ALL_CORE_FT
PRINCIPAL PROCEDURE  Procedure: Open reduction and internal fixation (ORIF) of fracture of left proximal humerus  Findings and Treatment:

## 2022-02-25 NOTE — DISCHARGE NOTE PROVIDER - CARE PROVIDER_API CALL
Peyman Hernandez)  Orthopaedic Surgery  [address]  [place] [address]  Phone: [phone]  Fax: [phone]  Follow Up Time: 2 weeks

## 2022-02-25 NOTE — DISCHARGE NOTE PROVIDER - HOSPITAL COURSE
This patient was admitted to [facility] with a history of Left humerus fracture.  Patient went to Pre-Surgical Testing at [facility] and was medically cleared to undergo elective procedure. Patient underwent left humerus fracture ORIF by Dr. Hernandez on 2/25/2022.  No operative or panfilo-operative complications arose during patients hospital course.  Patient received antibiotic according to SCIP guidelines for infection prevention. Ecotrin was given for DVT prophylaxis.  Anesthesia, Medical Hospitalist, Physical Therapy and Occupational Therapy were consulted. Patient is stable for discharge with a good prognosis.  Appropriate discharge instructions and medications are provided in this document.

## 2022-02-25 NOTE — DISCHARGE NOTE PROVIDER - NSDCCPCAREPLAN_GEN_ALL_CORE_FT
PRINCIPAL DISCHARGE DIAGNOSIS  Diagnosis: Other closed displaced fracture of proximal end of left humerus  Assessment and Plan of Treatment: Sling for comfort  Non weight bearing left upper extremity  May perform gental movement of shoulder, pendulum and cadmans   Perform elbow, wrist, fingers Range of motion  ice 20 minutes on alternating with 20 minutes off for 48 hours post op 20 minutes every hour for 48 hours post op  Silverlon dressing is waterproof.  You may shower, but do not aim shower stream at surgical site. Pat dry after shower.   Remove Silverlon dressing on postop day #7.  Leave steri strips inact, they will fall off on their own  Call  if you have any questions

## 2022-02-25 NOTE — ASU PATIENT PROFILE, ADULT - FALL HARM RISK - HARM RISK INTERVENTIONS

## 2022-02-25 NOTE — DISCHARGE NOTE PROVIDER - NSDCMRMEDTOKEN_GEN_ALL_CORE_FT
acetaminophen 325 mg oral tablet: 2 tab(s) orally every 6 hours, As needed, Mild Pain (1 - 3), Moderate Pain (4 - 6)  Centrum Silver oral tablet: 1 tab(s) orally once a day  Colace 100 mg oral capsule: 1 cap(s) orally once a day  FLUoxetine 20 mg oral capsule: 1 cap(s) orally once a day  MiraLax oral powder for reconstitution: 17 gram(s) orally once a day  multivitamin: 1 tab(s) orally once a day  PreserVision AREDS oral capsule: 1 cap(s) orally 2 times a day  tamsulosin 0.4 mg oral capsule: 1 cap(s) orally once a day  Vitamin B12 500 mcg oral tablet: 1 tab(s) orally once a day   acetaminophen 500 mg oral tablet: 2 tab(s) orally every 8 hours  Centrum Silver oral tablet: 1 tab(s) orally once a day  Colace 100 mg oral capsule: 1 cap(s) orally once a day  Ecotrin Adult Low Strength 81 mg oral delayed release tablet: 1 tab(s) orally once a day   FLUoxetine 20 mg oral capsule: 1 cap(s) orally once a day  MiraLax oral powder for reconstitution: 17 gram(s) orally once a day  multivitamin: 1 tab(s) orally once a day  oxyCODONE 5 mg oral tablet: 1 tab(s) orally every 4 hours MDD:6 tablets  PreserVision AREDS oral capsule: 1 cap(s) orally 2 times a day  tamsulosin 0.4 mg oral capsule: 1 cap(s) orally once a day  Vitamin B12 500 mcg oral tablet: 1 tab(s) orally once a day

## 2022-02-26 ENCOUNTER — TRANSCRIPTION ENCOUNTER (OUTPATIENT)
Age: 82
End: 2022-02-26

## 2022-02-26 VITALS
SYSTOLIC BLOOD PRESSURE: 97 MMHG | OXYGEN SATURATION: 100 % | TEMPERATURE: 98 F | DIASTOLIC BLOOD PRESSURE: 58 MMHG | RESPIRATION RATE: 18 BRPM | HEART RATE: 75 BPM

## 2022-02-26 LAB
ANION GAP SERPL CALC-SCNC: 5 MMOL/L — SIGNIFICANT CHANGE UP (ref 5–17)
BASOPHILS # BLD AUTO: 0.02 K/UL — SIGNIFICANT CHANGE UP (ref 0–0.2)
BASOPHILS NFR BLD AUTO: 0.2 % — SIGNIFICANT CHANGE UP (ref 0–2)
BUN SERPL-MCNC: 21 MG/DL — SIGNIFICANT CHANGE UP (ref 7–23)
CALCIUM SERPL-MCNC: 8 MG/DL — LOW (ref 8.4–10.5)
CHLORIDE SERPL-SCNC: 104 MMOL/L — SIGNIFICANT CHANGE UP (ref 96–108)
CO2 SERPL-SCNC: 28 MMOL/L — SIGNIFICANT CHANGE UP (ref 22–31)
CREAT SERPL-MCNC: 0.67 MG/DL — SIGNIFICANT CHANGE UP (ref 0.5–1.3)
EOSINOPHIL # BLD AUTO: 0 K/UL — SIGNIFICANT CHANGE UP (ref 0–0.5)
EOSINOPHIL NFR BLD AUTO: 0 % — SIGNIFICANT CHANGE UP (ref 0–6)
GLUCOSE SERPL-MCNC: 116 MG/DL — HIGH (ref 70–99)
HCT VFR BLD CALC: 25.3 % — LOW (ref 39–50)
HGB BLD-MCNC: 7.9 G/DL — LOW (ref 13–17)
IMM GRANULOCYTES NFR BLD AUTO: 0.4 % — SIGNIFICANT CHANGE UP (ref 0–1.5)
LYMPHOCYTES # BLD AUTO: 0.69 K/UL — LOW (ref 1–3.3)
LYMPHOCYTES # BLD AUTO: 6.8 % — LOW (ref 13–44)
MCHC RBC-ENTMCNC: 21.5 PG — LOW (ref 27–34)
MCHC RBC-ENTMCNC: 31.2 GM/DL — LOW (ref 32–36)
MCV RBC AUTO: 68.9 FL — LOW (ref 80–100)
MONOCYTES # BLD AUTO: 0.73 K/UL — SIGNIFICANT CHANGE UP (ref 0–0.9)
MONOCYTES NFR BLD AUTO: 7.2 % — SIGNIFICANT CHANGE UP (ref 2–14)
NEUTROPHILS # BLD AUTO: 8.71 K/UL — HIGH (ref 1.8–7.4)
NEUTROPHILS NFR BLD AUTO: 85.4 % — HIGH (ref 43–77)
NRBC # BLD: 0 /100 WBCS — SIGNIFICANT CHANGE UP (ref 0–0)
PLATELET # BLD AUTO: 147 K/UL — LOW (ref 150–400)
POTASSIUM SERPL-MCNC: 3.9 MMOL/L — SIGNIFICANT CHANGE UP (ref 3.5–5.3)
POTASSIUM SERPL-SCNC: 3.9 MMOL/L — SIGNIFICANT CHANGE UP (ref 3.5–5.3)
RBC # BLD: 3.67 M/UL — LOW (ref 4.2–5.8)
RBC # FLD: 15.8 % — HIGH (ref 10.3–14.5)
SODIUM SERPL-SCNC: 137 MMOL/L — SIGNIFICANT CHANGE UP (ref 135–145)
WBC # BLD: 10.19 K/UL — SIGNIFICANT CHANGE UP (ref 3.8–10.5)
WBC # FLD AUTO: 10.19 K/UL — SIGNIFICANT CHANGE UP (ref 3.8–10.5)

## 2022-02-26 PROCEDURE — 86850 RBC ANTIBODY SCREEN: CPT

## 2022-02-26 PROCEDURE — 97535 SELF CARE MNGMENT TRAINING: CPT

## 2022-02-26 PROCEDURE — 80048 BASIC METABOLIC PNL TOTAL CA: CPT

## 2022-02-26 PROCEDURE — U0005: CPT

## 2022-02-26 PROCEDURE — C1889: CPT

## 2022-02-26 PROCEDURE — 36415 COLL VENOUS BLD VENIPUNCTURE: CPT

## 2022-02-26 PROCEDURE — 97530 THERAPEUTIC ACTIVITIES: CPT

## 2022-02-26 PROCEDURE — 99222 1ST HOSP IP/OBS MODERATE 55: CPT

## 2022-02-26 PROCEDURE — U0003: CPT

## 2022-02-26 PROCEDURE — 80053 COMPREHEN METABOLIC PANEL: CPT

## 2022-02-26 PROCEDURE — 86901 BLOOD TYPING SEROLOGIC RH(D): CPT

## 2022-02-26 PROCEDURE — 76000 FLUOROSCOPY <1 HR PHYS/QHP: CPT

## 2022-02-26 PROCEDURE — 20931 SP BONE ALGRFT STRUCT ADD-ON: CPT

## 2022-02-26 PROCEDURE — C1713: CPT

## 2022-02-26 PROCEDURE — 85025 COMPLETE CBC W/AUTO DIFF WBC: CPT

## 2022-02-26 PROCEDURE — 97161 PT EVAL LOW COMPLEX 20 MIN: CPT

## 2022-02-26 PROCEDURE — G0463: CPT

## 2022-02-26 PROCEDURE — 86900 BLOOD TYPING SEROLOGIC ABO: CPT

## 2022-02-26 PROCEDURE — 23615 OPTX PROX HUMRL FX W/INT FIX: CPT

## 2022-02-26 PROCEDURE — 85027 COMPLETE CBC AUTOMATED: CPT

## 2022-02-26 RX ORDER — OXYCODONE HYDROCHLORIDE 5 MG/1
1 TABLET ORAL
Qty: 40 | Refills: 0
Start: 2022-02-26

## 2022-02-26 RX ORDER — OXYCODONE HYDROCHLORIDE 5 MG/1
10 TABLET ORAL
Refills: 0 | Status: DISCONTINUED | OUTPATIENT
Start: 2022-02-26 | End: 2022-02-26

## 2022-02-26 RX ORDER — OXYCODONE HYDROCHLORIDE 5 MG/1
5 TABLET ORAL
Refills: 0 | Status: DISCONTINUED | OUTPATIENT
Start: 2022-02-26 | End: 2022-02-26

## 2022-02-26 RX ORDER — ASPIRIN/CALCIUM CARB/MAGNESIUM 324 MG
1 TABLET ORAL
Qty: 42 | Refills: 0
Start: 2022-02-26 | End: 2022-04-08

## 2022-02-26 RX ORDER — ACETAMINOPHEN 500 MG
2 TABLET ORAL
Qty: 0 | Refills: 0 | DISCHARGE
Start: 2022-02-26

## 2022-02-26 RX ADMIN — OXYCODONE HYDROCHLORIDE 10 MILLIGRAM(S): 5 TABLET ORAL at 13:19

## 2022-02-26 RX ADMIN — Medication 1000 MILLIGRAM(S): at 05:00

## 2022-02-26 RX ADMIN — OXYCODONE HYDROCHLORIDE 10 MILLIGRAM(S): 5 TABLET ORAL at 04:58

## 2022-02-26 RX ADMIN — Medication 20 MILLIGRAM(S): at 12:50

## 2022-02-26 RX ADMIN — Medication 81 MILLIGRAM(S): at 05:01

## 2022-02-26 RX ADMIN — Medication 100 MILLIGRAM(S): at 00:00

## 2022-02-26 RX ADMIN — OXYCODONE HYDROCHLORIDE 10 MILLIGRAM(S): 5 TABLET ORAL at 12:49

## 2022-02-26 RX ADMIN — OXYCODONE HYDROCHLORIDE 10 MILLIGRAM(S): 5 TABLET ORAL at 09:29

## 2022-02-26 RX ADMIN — Medication 100 MILLIGRAM(S): at 08:17

## 2022-02-26 RX ADMIN — CELECOXIB 200 MILLIGRAM(S): 200 CAPSULE ORAL at 08:52

## 2022-02-26 RX ADMIN — OXYCODONE HYDROCHLORIDE 10 MILLIGRAM(S): 5 TABLET ORAL at 05:30

## 2022-02-26 RX ADMIN — Medication 1000 MILLIGRAM(S): at 06:03

## 2022-02-26 RX ADMIN — CELECOXIB 200 MILLIGRAM(S): 200 CAPSULE ORAL at 08:22

## 2022-02-26 RX ADMIN — OXYCODONE HYDROCHLORIDE 10 MILLIGRAM(S): 5 TABLET ORAL at 08:59

## 2022-02-26 NOTE — OCCUPATIONAL THERAPY INITIAL EVALUATION ADULT - ADDITIONAL COMMENTS
s/p fall in January and has been NWB for a few weeks with arm in sling   Lives with wife. 1 step to enter. Chairlift to 2nd floor. Wife was helping with ADL and mobility prior.  Was using both  a cane and RW prior

## 2022-02-26 NOTE — PHYSICAL THERAPY INITIAL EVALUATION ADULT - DISCHARGE DISPOSITION, PT EVAL
Procedure To Be Performed At Next Visit: Excision Detail Level: Simple Introduction Text (Please End With A Colon): The following procedure was deferred: home w/ assist/home w/ home PT

## 2022-02-26 NOTE — DISCHARGE NOTE NURSING/CASE MANAGEMENT/SOCIAL WORK - PATIENT PORTAL LINK FT
You can access the FollowMyHealth Patient Portal offered by [facility] by registering at the following website: [email or web address]. By joining [email or web address] [email or web address] portal, you will also be able to view your health information using other applications (apps) compatible with our system.

## 2022-02-26 NOTE — CONSULT NOTE ADULT - SUBJECTIVE AND OBJECTIVE BOX
HPI: 81M with BPH and history of achalasia that was corrected with surgery suffered a mechanical fall on 1/25/22 resulting in fracture of his Left Humerus. Patient was placed in a sling and scheduled for ORIF.   Patient is currently resting in bed comfortable with good pain control after having successful ORIF done 2/25/22.     REVIEW OF SYSTEMS:  CONSTITUTIONAL: No fever, weight loss, or fatigue  EYES: No eye pain, visual disturbances, or discharge  ENMT:  No difficulty hearing, tinnitus, vertigo; No sinus or throat pain  NECK: No pain or stiffness  RESPIRATORY: No cough, wheezing, chills or hemoptysis; No shortness of breath  CARDIOVASCULAR: No chest pain, palpitations, dizziness, or leg swelling  GASTROINTESTINAL: No abdominal or epigastric pain. No nausea, vomiting, or hematemesis; No diarrhea or constipation. No melena or hematochezia.  GENITOURINARY: No dysuria, frequency, hematuria, or incontinence  NEUROLOGICAL: No headaches, memory loss, loss of strength, numbness, or tremors  MUSCULOSKELETAL: No muscle or back pain      PAST MEDICAL & SURGICAL HISTORY:  Ganglion cyst of Left wrist    Eczema    Achalasia  resolved with surgeries and procedures    PEG (percutaneous endoscopic gastrostomy) status  and removed    S/P arthroscopy of right shoulder    Esophageal obstruction  had multiple surgeries and procedures        SOCIAL HISTORY:  Tobacco; EtOH; Illicit Drugs    Allergies    No Known Allergies    Intolerances        MEDICATIONS  (STANDING):  acetaminophen     Tablet .. 1000 milliGRAM(s) Oral every 8 hours  aspirin enteric coated 81 milliGRAM(s) Oral every 12 hours  celecoxib 200 milliGRAM(s) Oral every 12 hours  FLUoxetine 20 milliGRAM(s) Oral daily  lactated ringers. 1000 milliLiter(s) (100 mL/Hr) IV Continuous <Continuous>  tamsulosin 0.4 milliGRAM(s) Oral at bedtime    MEDICATIONS  (PRN):  aluminum hydroxide/magnesium hydroxide/simethicone Suspension 30 milliLiter(s) Oral four times a day PRN Indigestion  HYDROmorphone  Injectable 0.5 milliGRAM(s) IV Push every 4 hours PRN breakthrough pain  ondansetron Injectable 4 milliGRAM(s) IV Push every 6 hours PRN Nausea and/or Vomiting  oxyCODONE    IR 10 milliGRAM(s) Oral every 3 hours PRN Severe Pain (7 - 10)  oxyCODONE    IR 5 milliGRAM(s) Oral every 3 hours PRN Moderate Pain (4 - 6)      FAMILY HISTORY:  Family history of breast cancer in mother (Mother)  and son, daughter    Family history of cancer of gallbladder (Mother)        Vital Signs Last 24 Hrs  T(C): 36.8 (26 Feb 2022 11:11), Max: 37.1 (25 Feb 2022 17:29)  T(F): 98.3 (26 Feb 2022 11:11), Max: 98.8 (25 Feb 2022 17:29)  HR: 75 (26 Feb 2022 11:11) (60 - 81)  BP: 97/58 (26 Feb 2022 11:11) (93/60 - 127/70)  BP(mean): 75 (25 Feb 2022 19:00) (68 - 76)  RR: 18 (26 Feb 2022 11:11) (12 - 23)  SpO2: 100% (26 Feb 2022 11:11) (95% - 100%)    PHYSICAL EXAM:    GENERAL: NAD, well-developed  HEAD:  Atraumatic, Normocephalic  EYES: EOMI, PERRLA, conjunctiva and sclera clear  ENMT: No tonsillar erythema, exudates, or enlargement; Moist mucous membranes, Good dentition, No lesions  NECK: Supple, No JVD, Normal thyroid  NERVOUS SYSTEM:  Alert & Oriented X3, Good concentration;  CHEST/LUNG: Clear to auscultation bilaterally; No rales, rhonchi, wheezing, or rubs  HEART: Regular rate and rhythm; No murmurs, rubs, or gallops  ABDOMEN: Soft, Nontender, Nondistended; Bowel sounds present  EXTREMITIES:  2+ Peripheral Pulses, No clubbing, cyanosis, or edema      LABS:                        7.9    10.19 )-----------( 147      ( 26 Feb 2022 06:46 )             25.3     02-26    137  |  104  |  21  ----------------------------<  116<H>  3.9   |  28  |  0.67    Ca    8.0<L>      26 Feb 2022 06:46          CAPILLARY BLOOD GLUCOSE          RADIOLOGY & ADDITIONAL STUDIES:    EKG:   Personally Reviewed:  [ ] YES     Imaging:   Personally Reviewed:  [ ] YES     Consultant(s) Notes Reviewed:      Care Discussed with Consultants/Other Providers:

## 2022-02-26 NOTE — PHYSICAL THERAPY INITIAL EVALUATION ADULT - ADDITIONAL COMMENTS
Pt lives in private home with wife. Home has 1 [address] and full flight of steps with chair lift. Pt has assistance from wife as needed. Pt used RW prior to injury and now uses cane and contact guard assist from wife. Pt has cane.

## 2022-02-26 NOTE — DISCHARGE NOTE NURSING/CASE MANAGEMENT/SOCIAL WORK - NSSCNAMETXT_GEN_ALL_CORE
[facility] [place] - [phone] or [phone]  Nurse to visit the day after hospital discharge; physical therapist to follow. Please contact the home care agency if you have not heard from them by 12 noon on the day after your hospital discharge.   [facility] [place]-[phone]  Nurse to visit the day after hospital discharge; physical therapist to follow. Please contact the home care agency if you have not heard from them by 12 noon on the day after your hospital discharge.

## 2022-02-26 NOTE — PHYSICAL THERAPY INITIAL EVALUATION ADULT - RANGE OF MOTION EXAMINATION, REHAB EVAL
Left UE limited s/p ORIF/Right UE ROM was WFL (within functional limits)/bilateral lower extremity ROM was WFL (within functional limits)

## 2022-02-26 NOTE — CONSULT NOTE ADULT - ASSESSMENT
81M with BPH and history of achalasia admitted for aftercare following ORIF of Left Humerus.     S/P ORIF Left Humerus 2/25/22  - Continue Bowel and pain control regimen;  Incentive Spirometer for lung expansion; Monitor Hgb and follow up electrolytes.      BPH - Flomax     Diet - Regular    DVT Prophylaxis - Aspirin BID    Disposition - Discharge planning pending hospital course 81M with BPH and history of achalasia admitted for aftercare following ORIF of Left Humerus.     S/P ORIF Left Humerus 2/25/22  - Continue Bowel and pain control regimen;  Incentive Spirometer for lung expansion; Monitor Hgb and follow up electrolytes.      BPH - Flomax     Diet - Regular    DVT Prophylaxis - Aspirin BID    Disposition - Patient medically optimized for discharge home if cleared by PT and Ortho.

## 2022-02-26 NOTE — PHYSICAL THERAPY INITIAL EVALUATION ADULT - PERTINENT HX OF CURRENT PROBLEM, REHAB EVAL
this is a 82 y/o male  who fell and fractured left humerus on 1/25/22; patient was hospitalized for 5 days and no problems were found relating to the cause of the fall; patient is wearing a sling, to have repair of fracture

## 2022-02-26 NOTE — PROGRESS NOTE ADULT - SUBJECTIVE AND OBJECTIVE BOX
YAMILKA SUAZO                                                                [id number]                                                     Allergies---No Known Allergies        Pt is a 81y year old Male s/p ORIF left humerus fracture.   Pt. is A&O x 3, resting comfortably, with no complaints.   Pain is 2/10.   Tolerating the diet.   Denies chest pain / shortness of breath / dyspnea / nausea / vomiting / headaches or light headed ness.         Vital Signs Last 24 Hrs  T(F): 97.7 (02-26-22 @ 08:13), Max: 98.1 (02-25-22 @ 23:59)  HR: 62 (02-26-22 @ 08:13)  BP: 113/68 (02-26-22 @ 08:13)  RR: 18 (02-26-22 @ 08:13)  SpO2: 96% (02-26-22 @ 08:13)    I&O's Detail    25 Feb 2022 07:01  -  26 Feb 2022 07:00  --------------------------------------------------------  IN:    IV PiggyBack: 100 mL    Lactated Ringers: 2100 mL    Oral Fluid: 220 mL  Total IN: 2420 mL    OUT:    Blood Loss (mL): 350 mL    Voided (mL): 1210 mL  Total OUT: 1560 mL    Total NET: 860 mL        PE:   Left Lower Extremity:   Pt is currently in a sling.   Silverlon Dressing is C/D/I.   No redness, swelling, heat, discharge or other evidence of infection, superficial or deep.   Neurovascularly intact.   No gross evidence of motor or sensory deficit.   +2 pulses.   fingers are pink and mobile.   Capillary refill < 2 seconds.   Negative calf tenderness.   PAS on.                               7.9    10.19 )--------------( 147                          02-26-22 @ 06:46               25.3         137   |  104  |  21  -----------------------------<  116                  02-26-22 @ 06:46  3.9    |  28    |  0.67        Ca    8.0<L>              A:   Pt is a 81y year old Male S/P ORIF left humerus fracture, Post Op Day #1        Plan:    - Follow up with Medicine    - OOB with PT/OT, with sling   - Pain control    - Incentive spirometry   - Discharge Planning for home   - DVT ppx = PAS +  aspirin enteric coated 81 milliGRAM(s) Oral every 12 hours                                                                                                                                                                             Carlos BOSWELL

## 2022-02-26 NOTE — PHYSICAL THERAPY INITIAL EVALUATION ADULT - MANUAL MUSCLE TESTING RESULTS, REHAB EVAL
Patient:   APRIL HENRY            MRN: [id number]            FIN: [id number]              Age:   87 years     Sex:  MALE     :  32   Associated Diagnoses:   None   Author:   NAUN ELIZALDE     Postoperative Information     Postoperative Information   Postoperative Information:          Preoperative Diagnosis:    PERIPHERAL VASCULAR DISEASE, LEFT FOOT ULCER  .         Postoperative Diagnosis:    PERIPHERAL VASCULAR DISEASE, LEFT FOOT ULCER  .         Performed by:    JOSÉ MANUEL ARREOLA (Surgeon - Primary)  .         Assistant:    NAUN ELIZALDE (Resident)  .         Surgical Tasks Performed by Assistant: Holding wire, holding pressure .         Procedures Performed:    LEFT LOWER EXTREMITY ANGIOGRAM,  .         Findings: Severe peripheral vascular disease in left lower extremity.         Specimens Removed: None,    .         Estimated Blood Loss: 5  ml,    .         Blood Administered: No.         Complications: None.         Grafts/Implants: None.    Anesthetic utilized:     BASIL TAYLOR (Supervisor)  .   BLE grossly 5/5/grossly assessed due to

## 2022-02-26 NOTE — DISCHARGE NOTE NURSING/CASE MANAGEMENT/SOCIAL WORK - NSDCPEFALRISK_GEN_ALL_CORE
For information on Fall & Injury Prevention, visit: [email or web address] OR  [email or web address] OR  https://www.cdc.gov/steadi/patient.html

## 2022-02-28 ENCOUNTER — APPOINTMENT (OUTPATIENT)
Dept: ORTHOPEDIC SURGERY | Facility: CLINIC | Age: 82
End: 2022-02-28
Payer: MEDICARE

## 2022-02-28 RX ORDER — TRAMADOL HYDROCHLORIDE 50 MG/1
50 TABLET, COATED ORAL
Qty: 20 | Refills: 0 | Status: ACTIVE | COMMUNITY
Start: 2022-02-28 | End: 1900-01-01

## 2022-03-07 ENCOUNTER — APPOINTMENT (OUTPATIENT)
Dept: ORTHOPEDIC SURGERY | Facility: CLINIC | Age: 82
End: 2022-03-07
Payer: MEDICARE

## 2022-03-07 PROBLEM — K22.0 ACHALASIA OF CARDIA: Chronic | Status: ACTIVE | Noted: 2022-01-25

## 2022-03-07 PROCEDURE — 73030 X-RAY EXAM OF SHOULDER: CPT | Mod: LT

## 2022-03-07 PROCEDURE — 99024 POSTOP FOLLOW-UP VISIT: CPT

## 2022-03-08 NOTE — ADDENDUM
[FreeTextEntry1] : I, Leah Orellana wrote this note acting as a scribe for Dr. Peyman Hernandez on Mar 07, 2022.

## 2022-03-08 NOTE — HISTORY OF PRESENT ILLNESS
[de-identified] : s/p Open reduction internal fixation of left proximal humerus with  fibular strut allograft and 15 mL cancellous allograft chips. [de-identified] : The patient is a 81 year male who returns for the 1st postoperative visit after undergoing Open reduction internal fixation of left proximal humerus with  fibular strut allograft and 15 mL cancellous allograft chips at [facility]. The surgery was on 02/25/2022. The patient is recovering at home. He reports mild postoperative pain. He has been taking Tramadol for pain. [de-identified] : Patient is WDWN, alert, and in no acute distress. Breathing is unlabored. He is grossly oriented to person, place, and time.\par \par Sutures are dissolvable. Incision site is healing well without signs of postoperative infection present.\par Normal amount of postoperative edema and tenderness noted. \par ROM: Accessed passively to 90° \par Numbness to the digits noted since the initial trauma/injury [de-identified] : AP, transcapula, and axillary views of the LEFT shoulder were obtained and revealed a well aligned comminuted proximal humerus fracture stabilized by Six-hole Synthes proximal humeral plate with multiple locked and nonlocked screws.The most distal screw has backed out of the plate.Intraop Xrays were reviewed and they showed the most distal screw to be well positioned. [de-identified] : Xrays reviewed with the patient and his wife.\par They were advised that the most distal screw has backed out of the plate abut that the fracture is in good position.. \par He was instructed to discontinue use of the sling at home but should utilize the sling when outdoors.\par He was instructed on gentle  ROM exercises passively of the left shoulder and to use the hand normally for ADLs. \par I did tell him that he should transition off of the Tramadol  to Tylenol ES. \par Advised to take calcium citrate, Vitamin D3 and Vitamin C.\par Use vitamin E oil on the scar.\par The patient wishes to proceed with physical therapy. A script was given. (Gentle ROM exercises)\par Follow up in 4- 6 weeks for repeat xrays. \par He is advised to return sooner if he develops any increased pain or any sign of wound infection.

## 2022-03-08 NOTE — END OF VISIT
[FreeTextEntry3] : All medical record entries made by the Scribe were at my,  Dr. Peyman Hernandez MD., direction and personally dictated by me on 03/07/2022. I have personally reviewed the chart and agree that the record accurately reflects my personal performance of the history, physical exam, assessment and plan.

## 2022-03-31 ENCOUNTER — APPOINTMENT (OUTPATIENT)
Dept: ORTHOPEDIC SURGERY | Facility: CLINIC | Age: 82
End: 2022-03-31
Payer: MEDICARE

## 2022-03-31 VITALS — WEIGHT: 175 LBS | HEIGHT: 69 IN | BODY MASS INDEX: 25.92 KG/M2

## 2022-03-31 PROCEDURE — 73030 X-RAY EXAM OF SHOULDER: CPT | Mod: LT

## 2022-03-31 PROCEDURE — 99024 POSTOP FOLLOW-UP VISIT: CPT

## 2022-03-31 RX ORDER — CEFADROXIL 500 MG/1
500 CAPSULE ORAL TWICE DAILY
Qty: 20 | Refills: 0 | Status: ACTIVE | COMMUNITY
Start: 2022-03-31 | End: 1900-01-01

## 2022-03-31 NOTE — ADDENDUM
[FreeTextEntry1] : I, Leah Orellana wrote this note acting as a scribe for Dr. Peyman Hernandez on Mar 31, 2022.

## 2022-03-31 NOTE — END OF VISIT
[FreeTextEntry3] : All medical record entries made by the Scribe were at my,  Dr. Peyman Hernandez MD., direction and personally dictated by me on 03/31/2022. I have personally reviewed the chart and agree that the record accurately reflects my personal performance of the history, physical exam, assessment and plan.

## 2022-03-31 NOTE — HISTORY OF PRESENT ILLNESS
[de-identified] : s/p Open reduction internal fixation of left proximal humerus with  fibular strut allograft and 15 mL cancellous allograft chips. [de-identified] : The patient is a 82 year male who returns for the 2nd postoperative visit after undergoing Open reduction internal fixation of left proximal humerus with  fibular strut allograft and 15 mL cancellous allograft chips at [facility]. The surgery was on 02/25/2022. He denies pain at this time. He is concerned with the incision site. He is taking Tylenol ES as needed. He reports as of late, he is able to sleep without the Tylenol.  [de-identified] : Patient is WDWN, alert, and in no acute distress. Breathing is unlabored. He is grossly oriented to person, place, and time.\par \par Incision site is healing well with formation of an abscess due to the suture not resorbing.\par No signs of infection, no signs or evidence of drainage. \par Normal amount of postoperative edema and tenderness noted. \par ROM: Active flexion to 90° ,passive 130\par left hand FROM [de-identified] : AP, transcapula, and axillary views of the LEFT shoulder were obtained and revealed a well aligned comminuted proximal humerus fracture stabilized by Six-hole Synthes proximal humeral plate with multiple locked and nonlocked screws.The most distal screw has backed out of the plate .Intraop Xrays were reviewed and they showed the most distal screw to be well positioned. Gapping and separation of the plate from the humerus is present. distally. Evidence of fracture healing.  [de-identified] : The patient as well as his wife were reassured that there are no signs of infection present upon my physical exam. I did tell them that the mild localized redness  is quite superficial, most likely a reaction to the monocryl suture. \par I did additionally discuss the change in position of the hardware. I told them that the fracture is not yet fully healed but the hardware has not failed. As such, I am not yet recommending physical therapy as it is contraindicated given the lack of healing and change in hardware position. He will continue with gentle ROM exercises of his own the left shoulder.\par I am recommending he take Calcium Citrate, Vitamin D3 and Vitamin C.\par He was instructed on local wound care and to use Neosporin along the incision site. \par RX: Duricef 500mg (10 day supply)\par Follow up in 4 weeks for repeat xrays. At that time, I will determine if he may begin physical therapy.

## 2022-05-05 ENCOUNTER — APPOINTMENT (OUTPATIENT)
Dept: ORTHOPEDIC SURGERY | Facility: CLINIC | Age: 82
End: 2022-05-05
Payer: MEDICARE

## 2022-05-05 PROCEDURE — 73030 X-RAY EXAM OF SHOULDER: CPT | Mod: LT

## 2022-05-05 PROCEDURE — 99024 POSTOP FOLLOW-UP VISIT: CPT

## 2022-05-06 NOTE — END OF VISIT
[FreeTextEntry3] : All medical record entries made by the Scribe were at my,  Dr. Peyman Hernandez MD., direction and personally dictated by me on 05/05/2022. I have personally reviewed the chart and agree that the record accurately reflects my personal performance of the history, physical exam, assessment and plan.

## 2022-05-06 NOTE — HISTORY OF PRESENT ILLNESS
[de-identified] : s/p Open reduction internal fixation of left proximal humerus with  fibular strut allograft and 15 mL cancellous allograft chips. [de-identified] : The patient is a 82 year male who returns for the 3rd postoperative visit after undergoing Open reduction internal fixation of left proximal humerus with  fibular strut allograft and 15 mL cancellous allograft chips at [facility]. The surgery was on 02/25/2022. He returns on 5/5/22 and reports to be doing well. He denies pain and reports increases in ROM. He followed at home exercises and did not receive physical therapy. No pain medication at this time. [de-identified] : Patient is WDWN, alert, and in no acute distress. Breathing is unlabored. He is grossly oriented to person, place, and time.\par \par Incision site is healing well with scabbing.\par No signs of infection, no signs or evidence of drainage. \par Normal amount of postoperative edema and tenderness noted. \par ROM: Active flexion to 130°\par left hand FROM [de-identified] : AP, transcapula, and axillary views of the LEFT shoulder were obtained and revealed a well aligned comminuted proximal humerus fracture stabilized by Six-hole Synthes proximal humeral plate with multiple locked and nonlocked screws.The most distal screw has backed out of the plate .Intraop Xrays were reviewed and they showed the most distal screw to be well positioned. Gapping and separation of the plate from the humerus is present. distally. Fracture is healing.  [de-identified] : Xrays were reviewed with the patient and his wife. They were advised the fracture is healing well. He was instructed to continue to use the the upper extremity for ADLs. \par He will continue with an at home exercise program.\par Follow up in 3 months for repeat xrays.

## 2022-05-06 NOTE — ADDENDUM
[FreeTextEntry1] : I, Leah Orellana wrote this note acting as a scribe for Dr. Peyman Hernandez on May 05, 2022.

## 2022-08-08 ENCOUNTER — APPOINTMENT (OUTPATIENT)
Dept: ORTHOPEDIC SURGERY | Facility: CLINIC | Age: 82
End: 2022-08-08

## 2022-08-08 PROCEDURE — 99213 OFFICE O/P EST LOW 20 MIN: CPT

## 2022-08-08 PROCEDURE — 73030 X-RAY EXAM OF SHOULDER: CPT | Mod: LT

## 2022-08-08 RX ORDER — FLUOXETINE HYDROCHLORIDE 20 MG/1
20 CAPSULE ORAL
Qty: 90 | Refills: 0 | Status: ACTIVE | COMMUNITY
Start: 2022-06-13

## 2022-08-08 RX ORDER — ASPIRIN ENTERIC COATED TABLETS 81 MG 81 MG/1
81 TABLET, DELAYED RELEASE ORAL
Qty: 42 | Refills: 0 | Status: ACTIVE | COMMUNITY
Start: 2022-02-26

## 2022-08-08 RX ORDER — OXYCODONE 5 MG/1
5 TABLET ORAL
Qty: 40 | Refills: 0 | Status: ACTIVE | COMMUNITY
Start: 2022-02-26

## 2022-08-08 NOTE — DISCUSSION/SUMMARY
[de-identified] : The underlying pathophysiology was reviewed with the patient. XR films were reviewed with the patient. Discussed at length the nature of the patient’s condition. \par \par We once again discussed the most distal screw backing out but I told him that given he does not have pain, he has full function of the shoulder and the hardware overall has not failed, I wouldn't recommend taking him back to the OR. He is in agreement.\par He may advance his activities as tolerated, without restrictions.\par \par All questions answered, understanding verbalized. Patient in agreement with plan of care. Follow up in 6 months for repeat xrays.

## 2022-08-08 NOTE — REASON FOR VISIT
[Follow-Up Visit] : a follow-up visit for [FreeTextEntry2] : s/p Open reduction internal fixation of left proximal humerus with fibular strut allograft and 15 mL cancellous allograft chips.

## 2022-08-08 NOTE — ADDENDUM
[FreeTextEntry1] : I, Leah Orellana wrote this note acting as a scribe for Dr. Peyman Hernandez on Aug 08, 2022.

## 2022-08-08 NOTE — PHYSICAL EXAM
[de-identified] : Patient is WDWN, alert, and in no acute distress. Breathing is unlabored. He is grossly oriented to person, place, and time.\par \par Patient is WDWN, alert, and in no acute distress. Breathing is unlabored. He is grossly oriented to person, place, and time.\par \par Incision site is healing well with scabbing.\par No signs of infection, no signs or evidence of drainage. \par Normal amount of postoperative edema and tenderness noted. \par ROM: Active flexion to 155°\par ROM: Abd: 130° \par ROM: IR: T5\par ROM: ER: 40° \par left hand FROM.  [de-identified] : AP, transcapula, and axillary views of the LEFT shoulder were obtained and revealed a well aligned comminuted proximal humerus fracture stabilized by Six-hole Synthes proximal humeral plate with multiple locked and nonlocked screws.The most distal screw has backed out of the plate. Intraop Xrays were reviewed and they showed the most distal screw to be well positioned. Gapping and separation of the plate from the humerus is present. distally. Fracture is healing.

## 2022-08-08 NOTE — END OF VISIT
[FreeTextEntry3] : All medical record entries made by the Scribe were at my,  Dr. Peyman Hernandez MD., direction and personally dictated by me on 08/08/2022. I have personally reviewed the chart and agree that the record accurately reflects my personal performance of the history, physical exam, assessment and plan.

## 2022-08-08 NOTE — HISTORY OF PRESENT ILLNESS
[de-identified] : The patient is a 82 year male who returns for a follow up visit after undergoing Open reduction internal fixation of left proximal humerus with fibular strut allograft and 15 mL cancellous allograft chips at [facility]. The surgery was on 02/25/2022. He returns on 8/8/22 and reports to be improving and is able to utilize the upper extremity for ADLs. He is able to drive without difficulty. He has completed at home physical therapy. He has continued with an at home exercise program. He states he would like to build up his overall strength. He denies pain.

## 2022-09-01 NOTE — PHYSICAL THERAPY INITIAL EVALUATION ADULT - PREDICTED DURATION OF THERAPY (DAYS/WKS), PT EVAL
Katie Encarnacion, Hope you are having a good week! I had the pleasure of following up with Ms. Norwood today in my [facility] clinic. She is very motivated to get to a better health.  She has lost 12# over last 12 weeks. We decided to continue naltrexone to help with her healthy lifestyle modification.  We may consider switching to Saxenda pending her labs next week. She is going to follow-up with the team here at [facility] including the dietitian and exercise physiologist to keep her accountable. I appreciate you letting us be a part of her weight loss journey. Please let me know if you have any questions or concerns. Have a good restful holiday weekend! Best DT 
1-2 days

## 2022-10-13 NOTE — PHYSICAL THERAPY INITIAL EVALUATION ADULT - NAME OF DISCHARGE PLANNER
Pt returned call. Message given. Pt stated that on 10/12 her hr was staying above 100bpm. On 10/13 her heart rate is staying around 55. Pt stated that she took Amiodarone on 10/12 and she wants to know if she should take it again today? Please advise. Recommendation to be emailed to case management office

## 2023-02-09 ENCOUNTER — APPOINTMENT (OUTPATIENT)
Dept: ORTHOPEDIC SURGERY | Facility: CLINIC | Age: 83
End: 2023-02-09
Payer: MEDICARE

## 2023-02-09 VITALS — WEIGHT: 185 LBS | BODY MASS INDEX: 26.48 KG/M2 | HEIGHT: 70 IN

## 2023-02-09 DIAGNOSIS — S42.292D OTHER DISPLACED FRACTURE OF UPPER END OF LEFT HUMERUS, SUBSEQUENT ENCOUNTER FOR FRACTURE WITH ROUTINE HEALING: ICD-10-CM

## 2023-02-09 PROCEDURE — 99213 OFFICE O/P EST LOW 20 MIN: CPT

## 2023-02-09 PROCEDURE — 73030 X-RAY EXAM OF SHOULDER: CPT | Mod: LT

## 2023-02-09 NOTE — DISCUSSION/SUMMARY
[de-identified] : The underlying pathophysiology was reviewed with the patient. XR films were reviewed with the patient. Discussed at length the nature of the patient’s condition. \par \par At this time, he may advance his activities as tolerated, without restrictions.\par \par All questions answered, understanding verbalized. Patient in agreement with plan of care. Follow up as needed.

## 2023-02-09 NOTE — HISTORY OF PRESENT ILLNESS
[de-identified] : The patient is a 82 year male who returns for a follow up visit after undergoing Open reduction internal fixation of left proximal humerus with fibular strut allograft and 15 mL cancellous allograft chips at [facility]. The surgery was on 02/25/2022. He returns on 2/9/23 and is doing well overall and reports improvements in motion.

## 2023-02-09 NOTE — PHYSICAL EXAM
[de-identified] : Patient is WDWN, alert, and in no acute distress. Breathing is unlabored. He is grossly oriented to person, place, and time.\par \par Left Shoulder:\par Incision site is healing well with scabbing.\par No signs of infection, no signs or evidence of drainage. \par Normal amount of postoperative edema and tenderness noted. \par ROM: Active flexion to 155°\par ROM: Abd: 130° \par ROM: IR: T5\par ROM: ER: 40° \par left hand FROM.  [de-identified] : AP, transcapula, and axillary views of the LEFT shoulder were obtained and revealed a well aligned comminuted proximal humerus fracture stabilized by [employer] proximal humeral plate with multiple locked and nonlocked screws.The most distal screw has backed out of the plate. Intra-op Xrays were reviewed and they showed the most distal screw to be well positioned. Gapping and separation of the plate from the humerus is present, distally. Fracture is healing. No post-traumatic arthritis present.

## 2023-02-09 NOTE — ADDENDUM
[FreeTextEntry1] : I, Leah Orellana wrote this note acting as a scribe for Dr. Peyman Hernandez on Feb 09, 2023.

## 2023-10-04 ENCOUNTER — APPOINTMENT (OUTPATIENT)
Dept: ORTHOPEDIC SURGERY | Facility: CLINIC | Age: 83
End: 2023-10-04
Payer: MEDICARE

## 2023-10-04 VITALS — WEIGHT: 220 LBS | BODY MASS INDEX: 32.58 KG/M2 | HEIGHT: 69 IN

## 2023-10-04 PROCEDURE — 73564 X-RAY EXAM KNEE 4 OR MORE: CPT | Mod: 50

## 2023-10-04 PROCEDURE — 99214 OFFICE O/P EST MOD 30 MIN: CPT | Mod: 25

## 2023-10-04 PROCEDURE — 20610 DRAIN/INJ JOINT/BURSA W/O US: CPT | Mod: LT

## 2023-11-06 ENCOUNTER — APPOINTMENT (OUTPATIENT)
Dept: ORTHOPEDIC SURGERY | Facility: CLINIC | Age: 83
End: 2023-11-06
Payer: MEDICARE

## 2023-11-06 PROCEDURE — 20611 DRAIN/INJ JOINT/BURSA W/US: CPT | Mod: LT

## 2023-11-06 PROCEDURE — 99214 OFFICE O/P EST MOD 30 MIN: CPT | Mod: 25

## 2023-12-07 ENCOUNTER — APPOINTMENT (OUTPATIENT)
Dept: ORTHOPEDIC SURGERY | Facility: CLINIC | Age: 83
End: 2023-12-07
Payer: MEDICARE

## 2023-12-07 VITALS — HEIGHT: 69 IN | BODY MASS INDEX: 32.58 KG/M2 | WEIGHT: 220 LBS

## 2023-12-07 PROCEDURE — 99213 OFFICE O/P EST LOW 20 MIN: CPT

## 2023-12-12 ENCOUNTER — OFFICE (OUTPATIENT)
Dept: URBAN - METROPOLITAN AREA CLINIC 27 | Facility: CLINIC | Age: 83
Setting detail: OPHTHALMOLOGY
End: 2023-12-12
Payer: MEDICARE

## 2023-12-12 DIAGNOSIS — H43.811: ICD-10-CM

## 2023-12-12 DIAGNOSIS — H26.491: ICD-10-CM

## 2023-12-12 DIAGNOSIS — H35.3131: ICD-10-CM

## 2023-12-12 DIAGNOSIS — H18.413: ICD-10-CM

## 2023-12-12 PROCEDURE — 92134 CPTRZ OPH DX IMG PST SGM RTA: CPT | Performed by: OPHTHALMOLOGY

## 2023-12-12 PROCEDURE — 92014 COMPRE OPH EXAM EST PT 1/>: CPT | Performed by: OPHTHALMOLOGY

## 2023-12-12 ASSESSMENT — REFRACTION_CURRENTRX
OS_SPHERE: +6.00
OS_CYLINDER: -0.50
OD_VPRISM_DIRECTION: SV
OD_OVR_VA: 20/
OS_VPRISM_DIRECTION: SV
OS_OVR_VA: 20/
OS_VPRISM_DIRECTION: SV
OD_VPRISM_DIRECTION: SV
OS_AXIS: 074
OS_SPHERE: +1.75
OS_VPRISM_DIRECTION: SV
OD_VPRISM_DIRECTION: SV
OD_SPHERE: +5.50
OS_AXIS: 165
OD_AXIS: 094
OD_SPHERE: +2.75
OD_SPHERE: +1.75
OD_CYLINDER: -0.50
OD_CYLINDER: -0.50
OS_CYLINDER: -0.75
OS_OVR_VA: 20/
OS_SPHERE: +2.75
OS_CYLINDER: SPHERE
OD_OVR_VA: 20/
OD_CYLINDER: SPHERE
OD_AXIS: 014
OD_OVR_VA: 20/
OS_OVR_VA: 20/

## 2023-12-12 ASSESSMENT — REFRACTION_AUTOREFRACTION
OD_AXIS: 077
OD_CYLINDER: +1.00
OD_SPHERE: -1.75
OS_AXIS: 017
OS_SPHERE: +0.25
OS_CYLINDER: +0.25

## 2023-12-12 ASSESSMENT — CONFRONTATIONAL VISUAL FIELD TEST (CVF)
OS_FINDINGS: FULL
OD_FINDINGS: FULL

## 2023-12-12 ASSESSMENT — REFRACTION_MANIFEST
OS_SPHERE: +2.75
OS_VA1: 20/20-2
OD_CYLINDER: SPH
OD_SPHERE: +1.75
OS_CYLINDER: -0.50
OS_AXIS: 110
OD_VA1: 20/20

## 2023-12-12 ASSESSMENT — SPHEQUIV_DERIVED
OD_SPHEQUIV: -1.25
OS_SPHEQUIV: 0.375
OS_SPHEQUIV: 2.5

## 2024-01-23 ENCOUNTER — APPOINTMENT (OUTPATIENT)
Dept: ORTHOPEDIC SURGERY | Facility: CLINIC | Age: 84
End: 2024-01-23
Payer: MEDICARE

## 2024-01-23 VITALS — BODY MASS INDEX: 34.07 KG/M2 | WEIGHT: 230 LBS | HEIGHT: 69 IN

## 2024-01-23 DIAGNOSIS — M17.0 BILATERAL PRIMARY OSTEOARTHRITIS OF KNEE: ICD-10-CM

## 2024-01-23 PROCEDURE — 99213 OFFICE O/P EST LOW 20 MIN: CPT

## 2024-01-23 NOTE — HISTORY OF PRESENT ILLNESS
[de-identified] : 83-year-old male presents for a follow-up of bilateral knee pain, left worse than right. He states he is going to PT twice a week. He states he is still improving. He states he is feeling really good. He denies any swelling or buckling. He states he thinks he does not need anymore PT at this time. He states he has started doing at-home exercise. We informed the patient he needs an HEP from the PT at this time. He had a left knee Monovisc injection on 11/06/2023.  He had a left knee cortisone injection on 10/04/2023 and reports minimal relief. The therapy has helped dramatically.   Radiology Results: 10/04/2023 Right Knee: Standing AP, lateral, tunnel and skyline views were obtained and reveal severe lateral and moderate patellofemoral arthritis.  Left Knee: Standing AP, lateral, tunnel and skyline views were obtained and reveal moderate tricompartmental osteoarthritis and moderate to severe patellofemoral arthritis

## 2024-01-23 NOTE — ADDENDUM
[FreeTextEntry1] : I, STEPHEN WESTBROOK, acted solely as a scribe for Dr. Jean Claude Vee on this date 01/23/2024.  All medical record entries made by the Scribe were at my, Dr. Jean Claude Vee, direction and personally dictated by me on 01/23/2024. I have reviewed the chart and agree that the record accurately reflects my personal performance of the history, physical exam, assessment and plan. I have also personally directed, reviewed, and agreed with the chart.

## 2024-01-23 NOTE — DISCUSSION/SUMMARY
[de-identified] : I went over the pathophysiology of the patient's symptoms in great detail with the patient. I am recommending the patient continues to go to physical therapy to obtain increases in their strength and mobility. A prescription was provided. We discussed the use of ice, Tylenol and anti-inflammatories to relieve pain. At-home strengthening exercises were discussed and demonstrated with the patient.   All of their questions were answered. They understand and consent to the plan.   FU in 3 months

## 2024-01-23 NOTE — PHYSICAL EXAM
[de-identified] : Constitutional o Appearance : well-nourished, well developed, alert, in no acute distress  Head and Face o Head :  Inspection : atraumatic, normocephalic o Face :  Inspection : no visible rash or discoloration Respiratory o Respiratory Effort: breathing unlabored  Neurologic o Mental Status Examination :  Orientation : grossly oriented to person, place and time Psychiatric o Mood and Affect: mood normal, affect appropriate   Right Lower Extremity o Buttock : no tenderness, swelling or deformities  o Right Hip :  Inspection/Palpation : no tenderness, swelling or deformities  Range of Motion : full and painless in all planes, no crepitance  Stability : joint stability intact  Strength : extension, flexion, adduction, abduction, internal rotation and external rotation 5/5   o Right Knee :  Inspection/Palpation : no medial compartment tenderness or lateral compartment tenderness to palpation, no swelling, trophic changes, edema,   Range of Motion : 0-125 active flexion and extension full and painless, no crepitance  Stability : no valgus or varus instability present on provocative testing  Strength : flexion and extension 5/5  Tests and Signs : Anterior Drawer negative, Lachman negative, Karl's negative  Left Lower Extremity o Buttock : no tenderness, swelling or deformities  o Left Hip :  Inspection/Palpation : no tenderness, no swelling or deformities  Range of Motion : full and painless in all planes, no crepitance  Stability : joint stability intact  Strength : extension, flexion, adduction, abduction, internal rotation and external rotation 5/5  o Left Knee :  Inspection/Palpation :  mild medial compartment tenderness , to palpation, mild swelling  Range of Motion :0-125 active flexion and extension full and painless, patellofemoral crepitance  Stability : no valgus or varus instability present on provocative testing  Strength : flexion and extension 5/5  Tests and Signs : Anterior Drawer negative, Lachman negative, Karl's negative  Gait: does not use a cane, limited core strength no significant extremity swelling or lymphedema

## 2024-04-23 ENCOUNTER — APPOINTMENT (OUTPATIENT)
Dept: ORTHOPEDIC SURGERY | Facility: CLINIC | Age: 84
End: 2024-04-23

## 2024-06-19 NOTE — PRE-OP CHECKLIST - WARM FLUIDS/WARM BLANKETS
Lauren Nick's chief complaint for this visit includes:  Chief Complaint   Patient presents with    Mohs     BCC central upper forehead     PCP: Wegener, Joel Daniel Irwin    Referring Provider:  Referred Self, MD  No address on file    /69   Pulse 60   LMP  (LMP Unknown)   SpO2 97%   Moderate Pain (4)        Allergies   Allergen Reactions    Penicillins Hives    Ambien [Zolpidem]      Complex behaviors    Statins [Statins]      Myalgias to multiple statins         Do you need any medication refills at today's visit? No    Yuliya Cruz CMA          The following medication was given:     MEDICATION:  Lidocaine with epinephrine 1% [id number]  ROUTE: SQ  SITE: see procedure note  DOSE: 3ml  LOT #: [id number]  : [employer]  EXPIRATION DATE: 8/31/2025  NDC#: 57829-698-18  Was there drug waste? no  Multi-dose vial: Yes    Nery Rincon  June 19, 2024       Vaseline and pressure dressing applied to Mohs site on Central upper forehead.  Wound care instructions reviewed with patient and AVS provided.  Patient verbalized understanding.  Patient will follow up for suture removal: N/A.  No further questions or concerns at this time.     no

## 2024-11-12 ENCOUNTER — APPOINTMENT (OUTPATIENT)
Dept: SURGERY | Facility: CLINIC | Age: 84
End: 2024-11-12
Payer: MEDICARE

## 2024-11-12 VITALS
SYSTOLIC BLOOD PRESSURE: 145 MMHG | DIASTOLIC BLOOD PRESSURE: 71 MMHG | WEIGHT: 232 LBS | HEIGHT: 67 IN | TEMPERATURE: 97.2 F | OXYGEN SATURATION: 96 % | BODY MASS INDEX: 36.41 KG/M2 | RESPIRATION RATE: 18 BRPM | HEART RATE: 80 BPM

## 2024-11-12 DIAGNOSIS — K61.39 OTHER ISCHIORECTAL ABSCESS: ICD-10-CM

## 2024-11-12 PROCEDURE — 46040 I&D ISCHIORCT&/PERIRCT ABSC: CPT

## 2024-11-12 PROCEDURE — 99203 OFFICE O/P NEW LOW 30 MIN: CPT

## 2024-11-12 RX ORDER — AMOXICILLIN AND CLAVULANATE POTASSIUM 875; 125 MG/1; MG/1
875-125 TABLET, COATED ORAL
Qty: 14 | Refills: 0 | Status: ACTIVE | COMMUNITY
Start: 2024-11-12 | End: 1900-01-01

## 2024-11-13 ENCOUNTER — APPOINTMENT (OUTPATIENT)
Dept: COLORECTAL SURGERY | Facility: CLINIC | Age: 84
End: 2024-11-13

## 2024-12-12 ENCOUNTER — OFFICE (OUTPATIENT)
Dept: URBAN - METROPOLITAN AREA CLINIC 27 | Facility: CLINIC | Age: 84
Setting detail: OPHTHALMOLOGY
End: 2024-12-12
Payer: MEDICARE

## 2024-12-12 DIAGNOSIS — H26.491: ICD-10-CM

## 2024-12-12 DIAGNOSIS — H43.813: ICD-10-CM

## 2024-12-12 DIAGNOSIS — H18.413: ICD-10-CM

## 2024-12-12 DIAGNOSIS — H35.3131: ICD-10-CM

## 2024-12-12 PROCEDURE — 92012 INTRM OPH EXAM EST PATIENT: CPT | Performed by: OPHTHALMOLOGY

## 2024-12-12 PROCEDURE — 92134 CPTRZ OPH DX IMG PST SGM RTA: CPT | Performed by: OPHTHALMOLOGY

## 2024-12-12 PROCEDURE — 92202 OPSCPY EXTND ON/MAC DRAW: CPT | Performed by: OPHTHALMOLOGY

## 2024-12-12 ASSESSMENT — REFRACTION_MANIFEST
OS_VA1: 20/20-2
OD_CYLINDER: SPH
OS_CYLINDER: -0.50
OS_AXIS: 110
OD_VA1: 20/20
OD_SPHERE: +1.75
OS_SPHERE: +2.75

## 2024-12-12 ASSESSMENT — TONOMETRY
OD_IOP_MMHG: 10
OS_IOP_MMHG: 12

## 2024-12-12 ASSESSMENT — KERATOMETRY
OS_K1POWER_DIOPTERS: 40.75
OD_AXISANGLE_DEGREES: 93
OS_AXISANGLE_DEGREES: 84
METHOD_AUTO_MANUAL: AUTO
OD_K2POWER_DIOPTERS: 41.50
OS_K2POWER_DIOPTERS: 42.00
OD_K1POWER_DIOPTERS: 40.75

## 2024-12-12 ASSESSMENT — REFRACTION_CURRENTRX
OD_VPRISM_DIRECTION: SV
OS_OVR_VA: 20/
OD_CYLINDER: SPHERE
OS_AXIS: 165
OS_SPHERE: +2.75
OD_SPHERE: +2.75
OD_OVR_VA: 20/
OD_CYLINDER: -0.50
OD_VPRISM_DIRECTION: SV
OD_SPHERE: +1.75
OD_VPRISM_DIRECTION: SV
OS_CYLINDER: -0.50
OD_AXIS: 094
OS_VPRISM_DIRECTION: SV
OS_SPHERE: +6.00
OD_SPHERE: +5.50
OS_VPRISM_DIRECTION: SV
OD_OVR_VA: 20/
OD_CYLINDER: -0.50
OS_SPHERE: +1.75
OS_OVR_VA: 20/
OD_OVR_VA: 20/
OS_CYLINDER: -0.75
OD_AXIS: 014
OS_AXIS: 074
OS_VPRISM_DIRECTION: SV
OS_CYLINDER: SPHERE
OS_OVR_VA: 20/

## 2024-12-12 ASSESSMENT — REFRACTION_AUTOREFRACTION
OD_CYLINDER: +0.50
OS_AXIS: 17
OS_CYLINDER: +0.50
OD_SPHERE: -0.75
OS_SPHERE: +0.25
OD_AXIS: 72

## 2024-12-12 ASSESSMENT — VISUAL ACUITY
OD_BCVA: 20/30
OS_BCVA: 20/20-2

## 2024-12-12 ASSESSMENT — CONFRONTATIONAL VISUAL FIELD TEST (CVF)
OS_FINDINGS: FULL
OD_FINDINGS: FULL

## 2024-12-17 ENCOUNTER — APPOINTMENT (OUTPATIENT)
Dept: SURGERY | Facility: CLINIC | Age: 84
End: 2024-12-17
Payer: MEDICARE

## 2024-12-17 VITALS
HEART RATE: 63 BPM | SYSTOLIC BLOOD PRESSURE: 145 MMHG | OXYGEN SATURATION: 98 % | RESPIRATION RATE: 17 BRPM | DIASTOLIC BLOOD PRESSURE: 85 MMHG | TEMPERATURE: 97.1 F

## 2024-12-17 DIAGNOSIS — K61.39 OTHER ISCHIORECTAL ABSCESS: ICD-10-CM

## 2024-12-17 PROCEDURE — 99024 POSTOP FOLLOW-UP VISIT: CPT

## 2025-01-24 ENCOUNTER — APPOINTMENT (OUTPATIENT)
Dept: SURGERY | Facility: CLINIC | Age: 85
End: 2025-01-24
Payer: MEDICARE

## 2025-01-24 VITALS
HEART RATE: 68 BPM | DIASTOLIC BLOOD PRESSURE: 81 MMHG | SYSTOLIC BLOOD PRESSURE: 157 MMHG | RESPIRATION RATE: 17 BRPM | TEMPERATURE: 97.4 F

## 2025-01-24 DIAGNOSIS — K61.39 OTHER ISCHIORECTAL ABSCESS: ICD-10-CM

## 2025-01-24 PROCEDURE — 46040 I&D ISCHIORCT&/PERIRCT ABSC: CPT | Mod: 58

## 2025-02-07 ENCOUNTER — APPOINTMENT (OUTPATIENT)
Dept: SURGERY | Facility: CLINIC | Age: 85
End: 2025-02-07
Payer: MEDICARE

## 2025-02-07 VITALS
DIASTOLIC BLOOD PRESSURE: 89 MMHG | SYSTOLIC BLOOD PRESSURE: 134 MMHG | HEART RATE: 75 BPM | TEMPERATURE: 97.2 F | RESPIRATION RATE: 17 BRPM | OXYGEN SATURATION: 98 %

## 2025-02-07 DIAGNOSIS — K61.39 OTHER ISCHIORECTAL ABSCESS: ICD-10-CM

## 2025-02-07 PROCEDURE — 99024 POSTOP FOLLOW-UP VISIT: CPT

## 2025-02-10 ENCOUNTER — OUTPATIENT (OUTPATIENT)
Dept: OUTPATIENT SERVICES | Facility: HOSPITAL | Age: 85
LOS: 1 days | End: 2025-02-10
Payer: MEDICARE

## 2025-02-10 ENCOUNTER — APPOINTMENT (OUTPATIENT)
Dept: MRI IMAGING | Facility: CLINIC | Age: 85
End: 2025-02-10
Payer: MEDICARE

## 2025-02-10 DIAGNOSIS — K61.39 OTHER ISCHIORECTAL ABSCESS: ICD-10-CM

## 2025-02-10 DIAGNOSIS — Z93.1 GASTROSTOMY STATUS: Chronic | ICD-10-CM

## 2025-02-10 DIAGNOSIS — K22.2 ESOPHAGEAL OBSTRUCTION: Chronic | ICD-10-CM

## 2025-02-10 DIAGNOSIS — Z98.890 OTHER SPECIFIED POSTPROCEDURAL STATES: Chronic | ICD-10-CM

## 2025-02-10 PROCEDURE — 72197 MRI PELVIS W/O & W/DYE: CPT

## 2025-02-10 PROCEDURE — 72197 MRI PELVIS W/O & W/DYE: CPT | Mod: 26

## 2025-02-10 PROCEDURE — A9585: CPT

## 2025-02-12 ENCOUNTER — NON-APPOINTMENT (OUTPATIENT)
Age: 85
End: 2025-02-12

## 2025-02-12 DIAGNOSIS — K60.30 ANAL FISTULA, UNSPECIFIED: ICD-10-CM

## 2025-02-14 ENCOUNTER — OUTPATIENT (OUTPATIENT)
Dept: OUTPATIENT SERVICES | Facility: HOSPITAL | Age: 85
LOS: 1 days | End: 2025-02-14
Payer: MEDICARE

## 2025-02-14 VITALS
SYSTOLIC BLOOD PRESSURE: 116 MMHG | OXYGEN SATURATION: 98 % | TEMPERATURE: 98 F | HEART RATE: 66 BPM | WEIGHT: 225.97 LBS | HEIGHT: 67 IN | RESPIRATION RATE: 18 BRPM | DIASTOLIC BLOOD PRESSURE: 77 MMHG

## 2025-02-14 DIAGNOSIS — K60.30 ANAL FISTULA, UNSPECIFIED: ICD-10-CM

## 2025-02-14 DIAGNOSIS — Z01.818 ENCOUNTER FOR OTHER PREPROCEDURAL EXAMINATION: ICD-10-CM

## 2025-02-14 DIAGNOSIS — K22.2 ESOPHAGEAL OBSTRUCTION: Chronic | ICD-10-CM

## 2025-02-14 DIAGNOSIS — K60.3 ANAL FISTULA: ICD-10-CM

## 2025-02-14 DIAGNOSIS — Z93.1 GASTROSTOMY STATUS: Chronic | ICD-10-CM

## 2025-02-14 DIAGNOSIS — Z87.81 PERSONAL HISTORY OF (HEALED) TRAUMATIC FRACTURE: Chronic | ICD-10-CM

## 2025-02-14 DIAGNOSIS — Z98.49 CATARACT EXTRACTION STATUS, UNSPECIFIED EYE: Chronic | ICD-10-CM

## 2025-02-14 DIAGNOSIS — Z98.890 OTHER SPECIFIED POSTPROCEDURAL STATES: Chronic | ICD-10-CM

## 2025-02-14 LAB
ANION GAP SERPL CALC-SCNC: 14 MMOL/L — SIGNIFICANT CHANGE UP (ref 5–17)
BUN SERPL-MCNC: 21 MG/DL — SIGNIFICANT CHANGE UP (ref 7–23)
CALCIUM SERPL-MCNC: 9.5 MG/DL — SIGNIFICANT CHANGE UP (ref 8.4–10.5)
CHLORIDE SERPL-SCNC: 103 MMOL/L — SIGNIFICANT CHANGE UP (ref 96–108)
CO2 SERPL-SCNC: 22 MMOL/L — SIGNIFICANT CHANGE UP (ref 22–31)
CREAT SERPL-MCNC: 0.78 MG/DL — SIGNIFICANT CHANGE UP (ref 0.5–1.3)
EGFR: 88 ML/MIN/1.73M2 — SIGNIFICANT CHANGE UP
GLUCOSE SERPL-MCNC: 93 MG/DL — SIGNIFICANT CHANGE UP (ref 70–99)
HCT VFR BLD CALC: 34.4 % — LOW (ref 39–50)
HGB BLD-MCNC: 10.9 G/DL — LOW (ref 13–17)
MCHC RBC-ENTMCNC: 21.3 PG — LOW (ref 27–34)
MCHC RBC-ENTMCNC: 31.7 G/DL — LOW (ref 32–36)
MCV RBC AUTO: 67.3 FL — LOW (ref 80–100)
NRBC BLD AUTO-RTO: 0 /100 WBCS — SIGNIFICANT CHANGE UP (ref 0–0)
PLATELET # BLD AUTO: 182 K/UL — SIGNIFICANT CHANGE UP (ref 150–400)
POTASSIUM SERPL-MCNC: 4.4 MMOL/L — SIGNIFICANT CHANGE UP (ref 3.5–5.3)
POTASSIUM SERPL-SCNC: 4.4 MMOL/L — SIGNIFICANT CHANGE UP (ref 3.5–5.3)
RBC # BLD: 5.11 M/UL — SIGNIFICANT CHANGE UP (ref 4.2–5.8)
RBC # FLD: 15.9 % — HIGH (ref 10.3–14.5)
SODIUM SERPL-SCNC: 139 MMOL/L — SIGNIFICANT CHANGE UP (ref 135–145)
WBC # BLD: 7.16 K/UL — SIGNIFICANT CHANGE UP (ref 3.8–10.5)
WBC # FLD AUTO: 7.16 K/UL — SIGNIFICANT CHANGE UP (ref 3.8–10.5)

## 2025-02-14 PROCEDURE — 85027 COMPLETE CBC AUTOMATED: CPT

## 2025-02-14 PROCEDURE — 80048 BASIC METABOLIC PNL TOTAL CA: CPT

## 2025-02-14 PROCEDURE — G0463: CPT

## 2025-02-14 RX ORDER — LIDOCAINE HCL/PF 10 MG/ML
0.2 VIAL (ML) INJECTION ONCE
Refills: 0 | Status: DISCONTINUED | OUTPATIENT
Start: 2025-02-19 | End: 2025-03-05

## 2025-02-14 RX ORDER — SODIUM CHLORIDE 9 G/1000ML
1000 INJECTION, SOLUTION INTRAVENOUS
Refills: 0 | Status: DISCONTINUED | OUTPATIENT
Start: 2025-02-19 | End: 2025-03-05

## 2025-02-14 NOTE — H&P PST ADULT - NSICDXPASTMEDICALHX_GEN_ALL_CORE_FT
PAST MEDICAL HISTORY:  Achalasia resolved with surgeries and procedures    Eczema     Ganglion cyst of Left wrist     Rectal fistula      PAST MEDICAL HISTORY:  Achalasia resolved with surgeries and procedures    Eczema     Ganglion cyst of Left wrist     Post traumatic stress disorder (PTSD)     Rectal fistula

## 2025-02-14 NOTE — H&P PST ADULT - HISTORY OF PRESENT ILLNESS
this is a 80 y/o male  who fell and fractured left humerus on 1/25/22; patient was hospitalized for 5 days and no problems were found relating to the cause of the fall; patient is wearing a sling, to have repair of fracture    laparoscopic esophageal myotomy - late 2019/early 2020    left hemurus fracture repair 2022 - [facility]     problem when as a late teenager had a balloon put down esophagus and had to practice swallowing - was good for approx 60 yrs and then had a reoccurance of inability to swallow solid food -  83 yo male pmhx achalasia s/p surgical repair as a teenager and laparoscopic esophageal myotomy (2019), acquired asymptomatic chronic abdominal wall hernia. Reports recurrent painful rectal abscess. Was evaluated by Dr. Soto and confirmed to have left lateral intersphincteric anal fistula. Currently denies rectal pain. Denies fever, chills, constipation, chest pain, palpitations, sob, syncope. Presents to PST prior to scheduled anal fistulotomy on 2/20/25 with Dr. Tarik Soto.    Of note, Pt had successful ORIF of left proximal humerus on 2/25/22 at [facility]. Denies dysphagia. Follows up with GI Dr. Adrian Maria annually. Tolerates regular diet, thin liquids. Denies dysphagia or GERD. 85 yo male pmhx achalasia s/p surgical repair as a teenager and laparoscopic esophageal myotomy (2019), acquired asymptomatic chronic abdominal wall hernia, BPH, PTSD (9/11; [place]). Reports recurrent painful rectal abscess. Was evaluated by Dr. Soto and confirmed to have left lateral intersphincteric anal fistula. Currently denies rectal pain. Denies fever, chills, constipation, chest pain, palpitations, sob, syncope. Presents to PST prior to scheduled anal fistulotomy on 2/20/25 with Dr. Tarik Soto.    Of note, Pt had successful ORIF of left proximal humerus on 2/25/22 at [facility]. Follows up with GI Dr. Adrian Maria annually. Tolerates regular diet, thin liquids. Denies dysphagia, aspiration or GERD.

## 2025-02-14 NOTE — H&P PST ADULT - NS MD HP INPLANTS MED DEV
left arm s/p ORIF/Lens implant left arm hardware s/p ORIF/Lens implant left humerus plate and screws/Lens implant

## 2025-02-14 NOTE — H&P PST ADULT - NSICDXFAMILYHX_GEN_ALL_CORE_FT
FAMILY HISTORY:  Mother  Still living? No  Family history of cancer of gallbladder, Age at diagnosis: Age Unknown  FHx: breast cancer, Age at diagnosis: Age Unknown    Child  Still living? Unknown  FHx: breast cancer, Age at diagnosis: Age Unknown

## 2025-02-14 NOTE — H&P PST ADULT - LAST STRESS TEST
sometime between 2020 and 2025 "normal" per patient, "sometime between 2020 and 2025  it was normal"

## 2025-02-14 NOTE — H&P PST ADULT - NSICDXPASTSURGICALHX_GEN_ALL_CORE_FT
PAST SURGICAL HISTORY:  Esophageal obstruction had multiple surgeries and procedures    H/O cataract extraction     H/O fracture of humerus     PEG (percutaneous endoscopic gastrostomy) status and removed    S/P arthroscopy of right shoulder      PAST SURGICAL HISTORY:  Esophageal obstruction had multiple surgeries and procedures    H/O cataract extraction     H/O fracture of humerus     PEG (percutaneous endoscopic gastrostomy) status and removed    S/P arthroscopy of right shoulder     S/P ORIF (open reduction internal fixation) fracture

## 2025-02-14 NOTE — H&P PST ADULT - PROBLEM SELECTOR PLAN 1
Counting Carbohydrates for Diabetes: Care Instructions  Overview     Managing the amount of carbohydrate (carbs) you eat is an important part of planning healthy meals when you have diabetes. Carbs raise blood sugar more than any other nutrient. Carbs are found in grains, starchy vegetables, fruits, and milk and yogurt. Carbs are also found in sugar-sweetened foods and drinks. The more carbs you eat at one time, the higher your blood sugar will rise. Counting carbs can help you keep your blood sugar within your target range. If you use insulin, counting carbs helps you match the right amount of insulin to the number of grams of carbs in a meal.  A registered dietitian or diabetes educator can help you plan meals and snacks. Follow-up care is a key part of your treatment and safety. Be sure to make and go to all appointments, and call your doctor if you are having problems. It's also a good idea to know your test results and keep a list of the medicines you take. How can you care for yourself at home? Know your daily amount of carbohydrates  Your daily amount depends on several things, such as your weight, how active you are, which diabetes medicines you take, and what your goals are for your blood sugar levels. A registered dietitian or diabetes educator can help you plan how many carbs to include in each meal and snack. For most adults, a guideline for the daily amount of carbs is:  45 to 60 grams at each meal. That's about the same as 3 to 4 carbohydrate servings. 15 to 20 grams at each snack. That's about the same as 1 carbohydrate serving. Count carbs  Counting carbs lets you know how much rapid-acting insulin to take before you eat. If you use an insulin pump, you get a constant rate of insulin during the day. So the pump must be programmed at meals. This gives you extra insulin to cover the rise in blood sugar after meals. If you take insulin:  Learn your own insulin-to-carb ratio.  You and your diabetes health professional will figure out the ratio. You can do this by testing your blood sugar after meals. For example, you may need a certain amount of insulin for every 15 grams of carbs. Add up the carb grams in a meal. Then you can figure out how many units of insulin to take based on your insulin-to-carb ratio. Exercise lowers blood sugar. You can use less insulin than you would if you were not doing exercise. Keep in mind that timing matters. If you exercise within 1 hour after a meal, your body may need less insulin for that meal than it would if you exercised 3 hours after the meal. Test your blood sugar to find out how exercise affects your need for insulin. If you do or don't take insulin:  Look at labels on packaged foods. This can tell you how many carbs are in a serving. Be aware of portions, or serving sizes. If a package has two servings and you eat the whole package, you need to double the number of grams of carbohydrate listed for one serving. Protein, fat, and fiber do not raise blood sugar as much as carbs do. If you eat a lot of these nutrients in a meal, your blood sugar will rise more slowly than it would otherwise. Where can you learn more? Go to http://cricketChangeTipdenae.info/  Enter G703 in the search box to learn more about \"Counting Carbohydrates for Diabetes: Care Instructions. \"  Current as of: May 9, 2022               Content Version: 13.4  © 3257-6914 [employer]. Care instructions adapted under license by [employer] (which disclaims liability or warranty for this information). If you have questions about a medical condition or this instruction, always ask your healthcare professional. [place] [address] any warranty or liability for your use of this information.          Counting Carbohydrates for Diabetes: Care Instructions  Overview     Managing the amount of carbohydrate (carbs) you eat is an important part of planning healthy meals when you have diabetes. Carbs raise blood sugar more than any other nutrient. Carbs are found in grains, starchy vegetables, fruits, and milk and yogurt. Carbs are also found in sugar-sweetened foods and drinks. The more carbs you eat at one time, the higher your blood sugar will rise. Counting carbs can help you keep your blood sugar within your target range. If you use insulin, counting carbs helps you match the right amount of insulin to the number of grams of carbs in a meal.  A registered dietitian or diabetes educator can help you plan meals and snacks. Follow-up care is a key part of your treatment and safety. Be sure to make and go to all appointments, and call your doctor if you are having problems. It's also a good idea to know your test results and keep a list of the medicines you take. How can you care for yourself at home? Know your daily amount of carbohydrates  Your daily amount depends on several things, such as your weight, how active you are, which diabetes medicines you take, and what your goals are for your blood sugar levels. A registered dietitian or diabetes educator can help you plan how many carbs to include in each meal and snack. For most adults, a guideline for the daily amount of carbs is:  45 to 60 grams at each meal. That's about the same as 3 to 4 carbohydrate servings. 15 to 20 grams at each snack. That's about the same as 1 carbohydrate serving. Count carbs  Counting carbs lets you know how much rapid-acting insulin to take before you eat. If you use an insulin pump, you get a constant rate of insulin during the day. So the pump must be programmed at meals. This gives you extra insulin to cover the rise in blood sugar after meals. If you take insulin:  Learn your own insulin-to-carb ratio. You and your diabetes health professional will figure out the ratio. You can do this by testing your blood sugar after meals.  For example, you may need a certain amount of insulin for every 15 grams of carbs. Add up the carb grams in a meal. Then you can figure out how many units of insulin to take based on your insulin-to-carb ratio. Exercise lowers blood sugar. You can use less insulin than you would if you were not doing exercise. Keep in mind that timing matters. If you exercise within 1 hour after a meal, your body may need less insulin for that meal than it would if you exercised 3 hours after the meal. Test your blood sugar to find out how exercise affects your need for insulin. If you do or don't take insulin:  Look at labels on packaged foods. This can tell you how many carbs are in a serving. Be aware of portions, or serving sizes. If a package has two servings and you eat the whole package, you need to double the number of grams of carbohydrate listed for one serving. Protein, fat, and fiber do not raise blood sugar as much as carbs do. If you eat a lot of these nutrients in a meal, your blood sugar will rise more slowly than it would otherwise. Where can you learn more? Go to http://www.gray.com/  Enter G703 in the search box to learn more about \"Counting Carbohydrates for Diabetes: Care Instructions. \"  Current as of: May 9, 2022               Content Version: 13.4  © 2006-2022 [employer]. Care instructions adapted under license by [employer] (which disclaims liability or warranty for this information). If you have questions about a medical condition or this instruction, always ask your healthcare professional. [place] [address] any warranty or liability for your use of this information. Anal fistulotomy on 2/20/25 with Dr. Tarik Soto.  Pre-op instructions given. Questions answered.

## 2025-02-14 NOTE — H&P PST ADULT - ASSESSMENT
DASI score: 7  DASI activity: 3 flights of stairs, all house chores, goes for walks  Loose teeth or denture: denies

## 2025-02-19 ENCOUNTER — APPOINTMENT (OUTPATIENT)
Dept: SURGERY | Facility: HOSPITAL | Age: 85
End: 2025-02-19
Payer: MEDICARE

## 2025-02-19 ENCOUNTER — RESULT REVIEW (OUTPATIENT)
Age: 85
End: 2025-02-19

## 2025-02-19 ENCOUNTER — TRANSCRIPTION ENCOUNTER (OUTPATIENT)
Age: 85
End: 2025-02-19

## 2025-02-19 ENCOUNTER — OUTPATIENT (OUTPATIENT)
Dept: OUTPATIENT SERVICES | Facility: HOSPITAL | Age: 85
LOS: 1 days | End: 2025-02-19
Payer: MEDICARE

## 2025-02-19 VITALS
HEIGHT: 67 IN | OXYGEN SATURATION: 97 % | RESPIRATION RATE: 16 BRPM | HEART RATE: 67 BPM | TEMPERATURE: 97 F | SYSTOLIC BLOOD PRESSURE: 114 MMHG | WEIGHT: 225.97 LBS | DIASTOLIC BLOOD PRESSURE: 67 MMHG

## 2025-02-19 VITALS
HEART RATE: 70 BPM | SYSTOLIC BLOOD PRESSURE: 148 MMHG | RESPIRATION RATE: 17 BRPM | OXYGEN SATURATION: 97 % | DIASTOLIC BLOOD PRESSURE: 78 MMHG

## 2025-02-19 DIAGNOSIS — Z87.81 PERSONAL HISTORY OF (HEALED) TRAUMATIC FRACTURE: Chronic | ICD-10-CM

## 2025-02-19 DIAGNOSIS — K22.2 ESOPHAGEAL OBSTRUCTION: Chronic | ICD-10-CM

## 2025-02-19 DIAGNOSIS — Z93.1 GASTROSTOMY STATUS: Chronic | ICD-10-CM

## 2025-02-19 DIAGNOSIS — Z98.890 OTHER SPECIFIED POSTPROCEDURAL STATES: Chronic | ICD-10-CM

## 2025-02-19 DIAGNOSIS — Z98.49 CATARACT EXTRACTION STATUS, UNSPECIFIED EYE: Chronic | ICD-10-CM

## 2025-02-19 PROCEDURE — 46275 REMOVE ANAL FIST INTER: CPT | Mod: 58

## 2025-02-19 PROCEDURE — 88304 TISSUE EXAM BY PATHOLOGIST: CPT | Mod: 26

## 2025-02-19 PROCEDURE — 46275 REMOVE ANAL FIST INTER: CPT

## 2025-02-19 PROCEDURE — 88304 TISSUE EXAM BY PATHOLOGIST: CPT

## 2025-02-19 RX ORDER — ZINC ACETATE DIHYDRATE 100 %
1 CRYSTALS MISCELLANEOUS
Refills: 0 | DISCHARGE

## 2025-02-19 RX ORDER — ACETAMINOPHEN 500 MG/5ML
650 LIQUID (ML) ORAL EVERY 6 HOURS
Refills: 0 | Status: DISCONTINUED | OUTPATIENT
Start: 2025-02-19 | End: 2025-03-05

## 2025-02-19 RX ORDER — CYANOCOBALAMIN (VITAMIN B-12) 1000MCG/ML
1 VIAL (ML) INJECTION
Refills: 0 | DISCHARGE

## 2025-02-19 RX ORDER — ACETAMINOPHEN 500 MG/5ML
2 LIQUID (ML) ORAL
Qty: 0 | Refills: 0 | DISCHARGE
Start: 2025-02-19

## 2025-02-19 RX ORDER — OXYCODONE HYDROCHLORIDE 30 MG/1
1 TABLET ORAL
Qty: 6 | Refills: 0
Start: 2025-02-19

## 2025-02-19 RX ORDER — FEXOFENADINE HCL 60 MG
1 TABLET ORAL
Refills: 0 | DISCHARGE

## 2025-02-19 NOTE — ASU DISCHARGE PLAN (ADULT/PEDIATRIC) - NURSING INSTRUCTIONS
OK to take Tylenol/Acetaminophen at 6PM TONIGHT (last dose @  12PM   in operating room)  for pain and every 6 hours after as needed. OK to take Motrin/Ibuprofen at ANY TIME TODAY for pain and every 6 hours after as needed.

## 2025-02-19 NOTE — ASU PATIENT PROFILE, ADULT - NSICDXPASTMEDICALHX_GEN_ALL_CORE_FT
PAST MEDICAL HISTORY:  Achalasia resolved with surgeries and procedures    Eczema     Ganglion cyst of Left wrist     Post traumatic stress disorder (PTSD)     Rectal fistula

## 2025-02-19 NOTE — ASU DISCHARGE PLAN (ADULT/PEDIATRIC) - FINANCIAL ASSISTANCE
[facility] provides services at a reduced cost to those who are determined to be eligible through [facility]’s financial assistance program. Information regarding [facility]’s financial assistance program can be found by going to [email or web address] or by calling [phone].

## 2025-02-19 NOTE — ASU PREOP CHECKLIST - NS PREOP CHK HIBICLENS NA
Azithromycin Counseling:  I discussed with the patient the risks of azithromycin including but not limited to GI upset, allergic reaction, drug rash, diarrhea, and yeast infections. Benzoyl Peroxide Pregnancy And Lactation Text: This medication is Pregnancy Category C. It is unknown if benzoyl peroxide is excreted in breast milk. High Dose Vitamin A Pregnancy And Lactation Text: High dose vitamin A therapy is contraindicated during pregnancy and breast feeding. Erythromycin Pregnancy And Lactation Text: This medication is Pregnancy Category B and is considered safe during pregnancy. It is also excreted in breast milk. Tetracycline Pregnancy And Lactation Text: This medication is Pregnancy Category D and not consider safe during pregnancy. It is also excreted in breast milk. Minocycline Counseling: Patient advised regarding possible photosensitivity and discoloration of the teeth, skin, lips, tongue and gums.  Patient instructed to avoid sunlight, if possible.  When exposed to sunlight, patients should wear protective clothing, sunglasses, and sunscreen.  The patient was instructed to call the office immediately if the following severe adverse effects occur:  hearing changes, easy bruising/bleeding, severe headache, or vision changes.  The patient verbalized understanding of the proper use and possible adverse effects of minocycline.  All of the patient's questions and concerns were addressed. Topical Retinoid Pregnancy And Lactation Text: This medication is Pregnancy Category C. It is unknown if this medication is excreted in breast milk. Erythromycin Counseling:  I discussed with the patient the risks of erythromycin including but not limited to GI upset, allergic reaction, drug rash, diarrhea, increase in liver enzymes, and yeast infections. Doxycycline Counseling:  Patient counseled regarding possible photosensitivity and increased risk for sunburn.  Patient instructed to avoid sunlight, if possible.  When exposed to sunlight, patients should wear protective clothing, sunglasses, and sunscreen.  The patient was instructed to call the office immediately if the following severe adverse effects occur:  hearing changes, easy bruising/bleeding, severe headache, or vision changes.  The patient verbalized understanding of the proper use and possible adverse effects of doxycycline.  All of the patient's questions and concerns were addressed. Tazorac Counseling:  Patient advised that medication is irritating and drying.  Patient may need to apply sparingly and wash off after an hour before eventually leaving it on overnight.  The patient verbalized understanding of the proper use and possible adverse effects of tazorac.  All of the patient's questions and concerns were addressed. Isotretinoin Counseling: Patient should get monthly blood tests, not donate blood, not drive at night if vision affected, not share medication, and not undergo elective surgery for 6 months after tx completed. Side effects reviewed, pt to contact office should one occur. High Dose Vitamin A Counseling: Side effects reviewed, pt to contact office should one occur. Isotretinoin Pregnancy And Lactation Text: This medication is Pregnancy Category X and is considered extremely dangerous during pregnancy. It is unknown if it is excreted in breast milk. Benzoyl Peroxide Counseling: Patient counseled that medicine may cause skin irritation and bleach clothing.  In the event of skin irritation, the patient was advised to reduce the amount of the drug applied or use it less frequently.   The patient verbalized understanding of the proper use and possible adverse effects of benzoyl peroxide.  All of the patient's questions and concerns were addressed. Dapsone Pregnancy And Lactation Text: This medication is Pregnancy Category C and is not considered safe during pregnancy or breast feeding. Azithromycin Pregnancy And Lactation Text: This medication is considered safe during pregnancy and is also secreted in breast milk. N/A Topical Retinoid counseling:  Patient advised to apply a pea-sized amount only at bedtime and wait 30 minutes after washing their face before applying.  If too drying, patient may add a non-comedogenic moisturizer. The patient verbalized understanding of the proper use and possible adverse effects of retinoids.  All of the patient's questions and concerns were addressed. Topical Sulfur Applications Counseling: Topical Sulfur Counseling: Patient counseled that this medication may cause skin irritation or allergic reactions.  In the event of skin irritation, the patient was advised to reduce the amount of the drug applied or use it less frequently.   The patient verbalized understanding of the proper use and possible adverse effects of topical sulfur application.  All of the patient's questions and concerns were addressed. Include Pregnancy/Lactation Warning?: No Winlevi Counseling:  I discussed with the patient the risks of topical clascoterone including but not limited to erythema, scaling, itching, and stinging. Patient voiced their understanding. Bactrim Pregnancy And Lactation Text: This medication is Pregnancy Category D and is known to cause fetal risk.  It is also excreted in breast milk. Birth Control Pills Pregnancy And Lactation Text: This medication should be avoided if pregnant and for the first 30 days post-partum. Topical Clindamycin Pregnancy And Lactation Text: This medication is Pregnancy Category B and is considered safe during pregnancy. It is unknown if it is excreted in breast milk. Tazorac Pregnancy And Lactation Text: This medication is not safe during pregnancy. It is unknown if this medication is excreted in breast milk. Azelaic Acid Counseling: Patient counseled that medicine may cause skin irritation and to avoid applying near the eyes.  In the event of skin irritation, the patient was advised to reduce the amount of the drug applied or use it less frequently.   The patient verbalized understanding of the proper use and possible adverse effects of azelaic acid.  All of the patient's questions and concerns were addressed. Dapsone Counseling: I discussed with the patient the risks of dapsone including but not limited to hemolytic anemia, agranulocytosis, rashes, methemoglobinemia, kidney failure, peripheral neuropathy, headaches, GI upset, and liver toxicity.  Patients who start dapsone require monitoring including baseline LFTs and weekly CBCs for the first month, then every month thereafter.  The patient verbalized understanding of the proper use and possible adverse effects of dapsone.  All of the patient's questions and concerns were addressed. Winlevi Pregnancy And Lactation Text: This medication is considered safe during pregnancy and breastfeeding. Topical Clindamycin Counseling: Patient counseled that this medication may cause skin irritation or allergic reactions.  In the event of skin irritation, the patient was advised to reduce the amount of the drug applied or use it less frequently.   The patient verbalized understanding of the proper use and possible adverse effects of clindamycin.  All of the patient's questions and concerns were addressed. Sarecycline Counseling: Patient advised regarding possible photosensitivity and discoloration of the teeth, skin, lips, tongue and gums.  Patient instructed to avoid sunlight, if possible.  When exposed to sunlight, patients should wear protective clothing, sunglasses, and sunscreen.  The patient was instructed to call the office immediately if the following severe adverse effects occur:  hearing changes, easy bruising/bleeding, severe headache, or vision changes.  The patient verbalized understanding of the proper use and possible adverse effects of sarecycline.  All of the patient's questions and concerns were addressed. Doxycycline Pregnancy And Lactation Text: This medication is Pregnancy Category D and not consider safe during pregnancy. It is also excreted in breast milk but is considered safe for shorter treatment courses. Azelaic Acid Pregnancy And Lactation Text: This medication is considered safe during pregnancy and breast feeding. Topical Sulfur Applications Pregnancy And Lactation Text: This medication is Pregnancy Category C and has an unknown safety profile during pregnancy. It is unknown if this topical medication is excreted in breast milk. Spironolactone Pregnancy And Lactation Text: This medication can cause feminization of the male fetus and should be avoided during pregnancy. The active metabolite is also found in breast milk. Tetracycline Counseling: Patient counseled regarding possible photosensitivity and increased risk for sunburn.  Patient instructed to avoid sunlight, if possible.  When exposed to sunlight, patients should wear protective clothing, sunglasses, and sunscreen.  The patient was instructed to call the office immediately if the following severe adverse effects occur:  hearing changes, easy bruising/bleeding, severe headache, or vision changes.  The patient verbalized understanding of the proper use and possible adverse effects of tetracycline.  All of the patient's questions and concerns were addressed. Patient understands to avoid pregnancy while on therapy due to potential birth defects. Spironolactone Counseling: Patient advised regarding risks of diarrhea, abdominal pain, hyperkalemia, birth defects (for female patients), liver toxicity and renal toxicity. The patient may need blood work to monitor liver and kidney function and potassium levels while on therapy. The patient verbalized understanding of the proper use and possible adverse effects of spironolactone.  All of the patient's questions and concerns were addressed. Birth Control Pills Counseling: Birth Control Pill Counseling: I discussed with the patient the potential side effects of OCPs including but not limited to increased risk of stroke, heart attack, thrombophlebitis, deep venous thrombosis, hepatic adenomas, breast changes, GI upset, headaches, and depression.  The patient verbalized understanding of the proper use and possible adverse effects of OCPs. All of the patient's questions and concerns were addressed. Detail Level: Detailed Bactrim Counseling:  I discussed with the patient the risks of sulfa antibiotics including but not limited to GI upset, allergic reaction, drug rash, diarrhea, dizziness, photosensitivity, and yeast infections.  Rarely, more serious reactions can occur including but not limited to aplastic anemia, agranulocytosis, methemoglobinemia, blood dyscrasias, liver or kidney failure, lung infiltrates or desquamative/blistering drug rashes. Aklief Pregnancy And Lactation Text: It is unknown if this medication is safe to use during pregnancy.  It is unknown if this medication is excreted in breast milk.  Breastfeeding women should use the topical cream on the smallest area of the skin for the shortest time needed while breastfeeding.  Do not apply to nipple and areola. Aklief counseling:  Patient advised to apply a pea-sized amount only at bedtime and wait 30 minutes after washing their face before applying.  If too drying, patient may add a non-comedogenic moisturizer.  The most commonly reported side effects including irritation, redness, scaling, dryness, stinging, burning, itching, and increased risk of sunburn.  The patient verbalized understanding of the proper use and possible adverse effects of retinoids.  All of the patient's questions and concerns were addressed.

## 2025-02-19 NOTE — ASU PATIENT PROFILE, ADULT - NSICDXPASTSURGICALHX_GEN_ALL_CORE_FT
PAST SURGICAL HISTORY:  Esophageal obstruction had multiple surgeries and procedures    H/O cataract extraction     H/O fracture of humerus     PEG (percutaneous endoscopic gastrostomy) status and removed    S/P arthroscopy of right shoulder     S/P ORIF (open reduction internal fixation) fracture

## 2025-02-19 NOTE — ASU DISCHARGE PLAN (ADULT/PEDIATRIC) - CARE PROVIDER_API CALL
Tarik Soto  Colon/Rectal Surgery  [address] [facility], [facility] [address]  [place] [address]  Phone: [phone]  Fax: [phone]  Follow Up Time: 2 weeks

## 2025-02-19 NOTE — ASU PREOP CHECKLIST - SPO2 (%)
97 VTAMA Counseling: I discussed with the patient that VTAMA is not for use in the eyes, mouth or mouth. They should call the office if they develop any signs of allergic reactions to VTAMA. The patient verbalized understanding of the proper use and possible adverse effects of VTAMA.  All of the patient's questions and concerns were addressed.

## 2025-02-19 NOTE — ASU PREOP CHECKLIST - VERIFY SURGICAL SITE/SIDE WITH PATIENT
Left message for patient to call back to ask about appointment that's scheduled.    N/A, anal fistula/done

## 2025-02-20 PROBLEM — K60.40: Chronic | Status: ACTIVE | Noted: 2025-02-14

## 2025-02-21 LAB — SURGICAL PATHOLOGY STUDY: SIGNIFICANT CHANGE UP

## 2025-03-11 ENCOUNTER — APPOINTMENT (OUTPATIENT)
Dept: SURGERY | Facility: CLINIC | Age: 85
End: 2025-03-11
Payer: MEDICARE

## 2025-03-11 VITALS
TEMPERATURE: 97.1 F | HEART RATE: 73 BPM | DIASTOLIC BLOOD PRESSURE: 80 MMHG | RESPIRATION RATE: 17 BRPM | SYSTOLIC BLOOD PRESSURE: 136 MMHG | OXYGEN SATURATION: 98 %

## 2025-03-11 DIAGNOSIS — Z09 ENCOUNTER FOR FOLLOW-UP EXAMINATION AFTER COMPLETED TREATMENT FOR CONDITIONS OTHER THAN MALIGNANT NEOPLASM: ICD-10-CM

## 2025-03-11 PROCEDURE — 99024 POSTOP FOLLOW-UP VISIT: CPT

## 2025-03-25 PROBLEM — F43.10 POST-TRAUMATIC STRESS DISORDER, UNSPECIFIED: Chronic | Status: ACTIVE | Noted: 2025-02-14

## 2025-04-05 NOTE — OCCUPATIONAL THERAPY INITIAL EVALUATION ADULT - PLANNED THERAPY INTERVENTIONS, OT EVAL
Cardiology Clinic Note: Jared Hurst MD    Date of Service: 6/16/2020  This visit is being performed via video to discuss Follow-up (OF 6 MONTHS, RANDOM CHEST PRESSURE LASTING ABOUT 2MINUTES)    Clinician Location: [facility] [address] Ernie Perez is in [place] and his identity has been established.   He understands that we are limiting office visits due to the coronavirus pandemic and was informed that consent to treat includes permission to submit charges to his insurance. It was also shared that without being seen and evaluated in person, there is a risk that the information and/or assessment may be incomplete or inaccurate.  25 minutes were spent in this encounter.       HPI:   Chris Mercer is a 59 year old man who was seen as a video visit today.  He was seated outside his house with his daughter.  He tells me that he has no new cardiac complaints such as chest pain, shortness of breath, palpitations, presyncope, or syncope.  On occasion he will have discomfort over the left side of his chest when he lays down particularly on his left side.  However, he still exercises quite vigorously boxing and lifting light weights.  He appears in excellent spirits.    Cardiac and Relevant Medical History  Specialty Problems        Cardiology Problems    Hyperlipidemia        CVA (cerebral vascular accident) (CMS/HCC)        Essential hypertension        Atrial fibrillation (CMS/[facility])              Review of Systems   Review of Systems   Constitutional: Negative.    HENT: Negative.    Eyes: Negative.    Respiratory: Negative.    Cardiovascular: Negative.    Gastrointestinal: Negative.    Endocrine: Negative.    Genitourinary: Negative.    Musculoskeletal: Negative.    Skin: Negative.    Allergic/Immunologic: Negative.    Neurological: Negative.    Hematological: Negative.    Psychiatric/Behavioral: Negative.        Past Medical History:   Diagnosis Date   • Allergy    • Atrial fibrillation (CMS/HCC)     • Essential (primary) hypertension    • Stroke (cerebrum) (CMS/HCC)        Past Surgical History:   Procedure Laterality Date   • Blood clot retraction         Family History   Problem Relation Age of Onset   • Heart disease Mother    • Asthma Daughter    • Stroke Neg Hx        Social History     Tobacco Use   • Smoking status: Never Smoker   • Smokeless tobacco: Never Used   Substance Use Topics   • Alcohol use: Not Currently     Frequency: Never   • Drug use: Never       ALLERGIES:   Allergen Reactions   • Sulfa Antibiotics HEADACHES       Current Outpatient Medications   Medication Sig Dispense Refill   • clonazePAM (KLONOPIN) 1 MG tablet Take 1 tablet by mouth nightly as needed for Anxiety. 30 tablet 3   • atorvastatin (LIPITOR) 20 MG tablet Take 1 tablet by mouth daily. 90 tablet 2   • traZODone (DESYREL) 150 MG tablet Take 1 tablet by mouth nightly. 30 tablet 3   • ELIQUIS 5 MG Tab TAKE 1 TABLET BY MOUTH TWICE DAILY 180 tablet 1   • amlodipine-benazepril (LOTREL) 2.5-10 MG per capsule Take 1 capsule by mouth daily. 90 capsule 0   • gabapentin (NEURONTIN) 300 MG capsule 1 tablet ever 8 hours 90 capsule 3   • mirtazapine (REMERON) 7.5 MG tablet Take 7.5 mg by mouth nightly.     • tamsulosin (FLOMAX) 0.4 MG Cap Take 0.4 mg by mouth daily after a meal.     • acetaminophen (TYLENOL) 325 MG tablet Take 650 mg by mouth every 4 hours as needed for Pain.     • metoPROLOL tartrate (LOPRESSOR) 25 MG tablet Take 12.5 mg by mouth 2 times daily.       No current facility-administered medications for this visit.          Objective:    Physical Exam:   There were no vitals taken for this visit.      Wt Readings from Last 4 Encounters:   05/22/20 77.1 kg (170 lb)   05/14/20 74.4 kg (164 lb)   03/03/20 74.7 kg (164 lb 10.9 oz)   01/23/20 76.4 kg (168 lb 6.9 oz)         Physical Exam  Not done as this was a video visit    Labs:  Recent Labs   Lab 03/03/20  1202 11/28/19  1110   CHOLESTEROL 115 123     Desirable             <200  Borderline High      200 to 239  High                 >=240      HDL 44 45  Low            <40  Borderline Low 40 to 49  Near Optimal   50 to 59  Optimal        >=60   TRIGLYCERIDE 108 92  Normal                   <150  Borderline High          150 to 199  High                     200 to 499  Very High                >=500   CALCULATED LDL 49 60  OPTIMAL               <100  NEAR OPTIMAL          100-129  BORDERLINE HIGH       130-159  HIGH                  160-189  VERY HIGH             >=190   CALCULATED NON HDL 71 78     Therapeutic Target:  CHD and risk equivalents <130  Multiple risk factors    <160  0 to 1 risk factors      <190          Recent Labs   Lab 05/15/20  0915 11/28/19  1110 11/28/19  0523 11/27/19  1115 06/30/19  2137   Sodium 140  --  142 140 141   Chloride 108*  --  105 104 105   BUN 15  --  17 14 16   GFR Estimate,  >90  --  73  eGFR 60 - 89 mL/min/1.73m2 = Mild decrease in kidney function. 82  eGFR 60 - 89 mL/min/1.73m2 = Mild decrease in kidney function. 77  eGFR 60 - 89 mL/min/1.73m2 = Mild decrease in kidney function.   BUN/Creatinine Ratio 15  --  14 12 13   Potassium 3.9  --  4.3 4.2 3.9   Glucose 126*  --  100* 100* 121*   Creatinine 1.03  --  1.24* 1.13 1.19*   GFR Estimate, Non  79  --  63  eGFR 60 - 89 mL/min/1.73m2 = Mild decrease in kidney function. 71  eGFR 60 - 89 mL/min/1.73m2 = Mild decrease in kidney function. 66  eGFR 60 - 89 mL/min/1.73m2 = Mild decrease in kidney function.   CALCIUM 8.9  --  9.3 9.6 9.2   TSH 1.998 1.589  Findings most consistent with euthyroid state, no additional testing suggested. TSH may be normal in patients with thyroid dysfunction and pituitary disease. Clinical correlation recommended.  (Reflex TSH algorithm is not recommended in hospitalized patients. A variety of drugs, as well as serious acute and chronic illnesses may alter thyroid function tests. Commonly implicated drugs include  glucocorticoids, dopamine, carbamazepine, iodine,   amiodarone, lithium and heparin.)  --   --   --        Recent Labs   Lab 05/15/20  0915 11/28/19  0523 11/27/19  1115 06/30/19  2137   WBC 6.6 6.8 7.6 8.6   RBC 5.76 5.96* 5.83 5.44   HGB 16.3 16.6 16.5 15.4   HCT 48.9 50.1 49.1 45.3   MCV 84.9 84.1 84.2 83.3   MCHC 33.3 33.1 33.6 34.0   RDW-CV 14.2 14.0 14.0 13.7    259 270 226   LYMPH 27 31 31 31       Recent Labs   Lab 05/15/20  0915 11/28/19 0523 11/27/19  1115   ALT/SGPT 41 41 40        No results for input(s): NTPROB in the last 8765 hours.    The ASCVD Risk score (G[place]coe KAMI Jr., et al., 2013) failed to calculate for the following reasons:    The patient has a prior MI or stroke diagnosis  Imaging:  No results found.  No results found for this or any previous visit.  No results found for this or any previous visit.  Results for orders placed in visit on 10/09/19   STRESS TEST WITH MYOCARDIAL PERFUSION     No results found for this or any previous visit.      Assessment    History of atrial fibrillation  10/14/2019 Lexiscan stress test normal.  5/21/2019 2D echo LVEF 55 to 60%, normal left atrial size.    Continue anticoagulation with Eliquis.  He is also taking metoprolol for rate control.  He is asymptomatic.  The atrial fibrillation appears intermittent.  Most recent ECG demonstrated normal sinus rhythm.  For now we will continue to monitor.     Hyperlipidemia  Continue atorvastatin.    Recent lipids drawn in March show LDL to be at target.     Chest pain  The chest pain he is having is noncardiac, probably musculoskeletal.     Essential hypertension  Blood pressure is well controlled.     JOSE (obstructive sleep apnea)  Per PCP.     CVA (cerebral vascular accident) (CMS/HCC)  Per PCP.  No evidence of recurrence.     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       No orders of the defined types were placed in this  encounter.      Recommendations  1.  I will continue to see him regularly.  He will next see me in 5 months.      Jared Hurst MD     No - the patient is unable to be screened due to medical condition Good recall of precautions.

## 2025-05-13 ENCOUNTER — RESULT REVIEW (OUTPATIENT)
Age: 85
End: 2025-05-13

## 2025-05-24 ENCOUNTER — OUTPATIENT (OUTPATIENT)
Dept: OUTPATIENT SERVICES | Facility: HOSPITAL | Age: 85
LOS: 1 days | End: 2025-05-24
Payer: MEDICARE

## 2025-05-24 ENCOUNTER — APPOINTMENT (OUTPATIENT)
Dept: CT IMAGING | Facility: CLINIC | Age: 85
End: 2025-05-24
Payer: MEDICARE

## 2025-05-24 DIAGNOSIS — Z09 ENCOUNTER FOR FOLLOW-UP EXAMINATION AFTER COMPLETED TREATMENT FOR CONDITIONS OTHER THAN MALIGNANT NEOPLASM: ICD-10-CM

## 2025-05-24 DIAGNOSIS — Z98.890 OTHER SPECIFIED POSTPROCEDURAL STATES: Chronic | ICD-10-CM

## 2025-05-24 DIAGNOSIS — Z98.49 CATARACT EXTRACTION STATUS, UNSPECIFIED EYE: Chronic | ICD-10-CM

## 2025-05-24 DIAGNOSIS — Z93.1 GASTROSTOMY STATUS: Chronic | ICD-10-CM

## 2025-05-24 DIAGNOSIS — Z87.81 PERSONAL HISTORY OF (HEALED) TRAUMATIC FRACTURE: Chronic | ICD-10-CM

## 2025-05-24 DIAGNOSIS — K22.2 ESOPHAGEAL OBSTRUCTION: Chronic | ICD-10-CM

## 2025-05-24 PROCEDURE — 74177 CT ABD & PELVIS W/CONTRAST: CPT | Mod: 26

## 2025-05-24 PROCEDURE — 74177 CT ABD & PELVIS W/CONTRAST: CPT

## 2025-05-27 ENCOUNTER — NON-APPOINTMENT (OUTPATIENT)
Age: 85
End: 2025-05-27

## 2025-06-03 ENCOUNTER — APPOINTMENT (OUTPATIENT)
Dept: SURGERY | Facility: CLINIC | Age: 85
End: 2025-06-03
Payer: MEDICARE

## 2025-06-03 VITALS
HEART RATE: 65 BPM | OXYGEN SATURATION: 94 % | RESPIRATION RATE: 18 BRPM | HEIGHT: 67 IN | BODY MASS INDEX: 34.84 KG/M2 | DIASTOLIC BLOOD PRESSURE: 72 MMHG | TEMPERATURE: 96.3 F | WEIGHT: 222 LBS | SYSTOLIC BLOOD PRESSURE: 128 MMHG

## 2025-06-03 DIAGNOSIS — K43.2 INCISIONAL HERNIA W/OUT OBSTRUCTION OR GANGRENE: ICD-10-CM

## 2025-06-03 PROCEDURE — 99213 OFFICE O/P EST LOW 20 MIN: CPT

## 2025-07-10 ENCOUNTER — OUTPATIENT (OUTPATIENT)
Dept: OUTPATIENT SERVICES | Facility: HOSPITAL | Age: 85
LOS: 1 days | End: 2025-07-10
Payer: MEDICARE

## 2025-07-10 VITALS
RESPIRATION RATE: 18 BRPM | OXYGEN SATURATION: 97 % | DIASTOLIC BLOOD PRESSURE: 73 MMHG | HEART RATE: 63 BPM | TEMPERATURE: 98 F | SYSTOLIC BLOOD PRESSURE: 108 MMHG | HEIGHT: 69 IN | WEIGHT: 220.02 LBS

## 2025-07-10 DIAGNOSIS — K22.2 ESOPHAGEAL OBSTRUCTION: Chronic | ICD-10-CM

## 2025-07-10 DIAGNOSIS — Z98.890 OTHER SPECIFIED POSTPROCEDURAL STATES: Chronic | ICD-10-CM

## 2025-07-10 DIAGNOSIS — Z93.1 GASTROSTOMY STATUS: Chronic | ICD-10-CM

## 2025-07-10 DIAGNOSIS — Z98.49 CATARACT EXTRACTION STATUS, UNSPECIFIED EYE: Chronic | ICD-10-CM

## 2025-07-10 DIAGNOSIS — Z87.81 PERSONAL HISTORY OF (HEALED) TRAUMATIC FRACTURE: Chronic | ICD-10-CM

## 2025-07-10 DIAGNOSIS — K43.2 INCISIONAL HERNIA WITHOUT OBSTRUCTION OR GANGRENE: ICD-10-CM

## 2025-07-10 PROBLEM — F43.10 POST-TRAUMATIC STRESS DISORDER, UNSPECIFIED: Chronic | Status: INACTIVE | Noted: 2025-02-14 | Resolved: 2025-07-10

## 2025-07-10 LAB
A1C WITH ESTIMATED AVERAGE GLUCOSE RESULT: 5.4 % — SIGNIFICANT CHANGE UP (ref 4–5.6)
ANION GAP SERPL CALC-SCNC: 13 MMOL/L — SIGNIFICANT CHANGE UP (ref 5–17)
BLD GP AB SCN SERPL QL: NEGATIVE — SIGNIFICANT CHANGE UP
BUN SERPL-MCNC: 20 MG/DL — SIGNIFICANT CHANGE UP (ref 7–23)
CALCIUM SERPL-MCNC: 9.7 MG/DL — SIGNIFICANT CHANGE UP (ref 8.4–10.5)
CHLORIDE SERPL-SCNC: 103 MMOL/L — SIGNIFICANT CHANGE UP (ref 96–108)
CO2 SERPL-SCNC: 23 MMOL/L — SIGNIFICANT CHANGE UP (ref 22–31)
CREAT SERPL-MCNC: 0.68 MG/DL — SIGNIFICANT CHANGE UP (ref 0.5–1.3)
EGFR: 91 ML/MIN/1.73M2 — SIGNIFICANT CHANGE UP
EGFR: 91 ML/MIN/1.73M2 — SIGNIFICANT CHANGE UP
ESTIMATED AVERAGE GLUCOSE: 108 MG/DL — SIGNIFICANT CHANGE UP (ref 68–114)
GLUCOSE SERPL-MCNC: 105 MG/DL — HIGH (ref 70–99)
HCT VFR BLD CALC: 36.7 % — LOW (ref 39–50)
HGB BLD-MCNC: 11.5 G/DL — LOW (ref 13–17)
MCHC RBC-ENTMCNC: 21.2 PG — LOW (ref 27–34)
MCHC RBC-ENTMCNC: 31.3 G/DL — LOW (ref 32–36)
MCV RBC AUTO: 67.7 FL — LOW (ref 80–100)
MRSA PCR RESULT.: SIGNIFICANT CHANGE UP
NRBC # BLD AUTO: 0 K/UL — SIGNIFICANT CHANGE UP (ref 0–0)
NRBC # FLD: 0 K/UL — SIGNIFICANT CHANGE UP (ref 0–0)
NRBC BLD AUTO-RTO: 0 /100 WBCS — SIGNIFICANT CHANGE UP (ref 0–0)
PLATELET # BLD AUTO: 199 K/UL — SIGNIFICANT CHANGE UP (ref 150–400)
PMV BLD: 10.2 FL — SIGNIFICANT CHANGE UP (ref 7–13)
POTASSIUM SERPL-MCNC: 4.7 MMOL/L — SIGNIFICANT CHANGE UP (ref 3.5–5.3)
POTASSIUM SERPL-SCNC: 4.7 MMOL/L — SIGNIFICANT CHANGE UP (ref 3.5–5.3)
RBC # BLD: 5.42 M/UL — SIGNIFICANT CHANGE UP (ref 4.2–5.8)
RBC # FLD: 16.7 % — HIGH (ref 10.3–14.5)
RH IG SCN BLD-IMP: POSITIVE — SIGNIFICANT CHANGE UP
S AUREUS DNA NOSE QL NAA+PROBE: DETECTED
SODIUM SERPL-SCNC: 139 MMOL/L — SIGNIFICANT CHANGE UP (ref 135–145)
WBC # BLD: 6.01 K/UL — SIGNIFICANT CHANGE UP (ref 3.8–10.5)
WBC # FLD AUTO: 6.01 K/UL — SIGNIFICANT CHANGE UP (ref 3.8–10.5)

## 2025-07-10 PROCEDURE — 86850 RBC ANTIBODY SCREEN: CPT

## 2025-07-10 PROCEDURE — 87641 MR-STAPH DNA AMP PROBE: CPT

## 2025-07-10 PROCEDURE — 86900 BLOOD TYPING SEROLOGIC ABO: CPT

## 2025-07-10 PROCEDURE — 86901 BLOOD TYPING SEROLOGIC RH(D): CPT

## 2025-07-10 PROCEDURE — G0463: CPT

## 2025-07-10 PROCEDURE — 87640 STAPH A DNA AMP PROBE: CPT

## 2025-07-10 PROCEDURE — 83036 HEMOGLOBIN GLYCOSYLATED A1C: CPT

## 2025-07-10 PROCEDURE — 85027 COMPLETE CBC AUTOMATED: CPT

## 2025-07-10 PROCEDURE — 80048 BASIC METABOLIC PNL TOTAL CA: CPT

## 2025-07-10 NOTE — H&P PST ADULT - ASSESSMENT
DASI score: 7   DASI activity: able to perform ADL, stairs and ambulates without SOB or WHITNEY   Loose teeth or denture: denies

## 2025-07-10 NOTE — H&P PST ADULT - HISTORY OF PRESENT ILLNESS
85 year old male presents to [facility] for scheduled incisional hernia repair with Dr. Tarik Soto on 7/24/25. PMH achalasia s/p repair as a teenager, laparoscopic esophageal myotomy 2019 (no concerns since), BPH, left humerus fracture with repair, osteoarthritis, anal fistula s/p fistulotomy 2/2025. Denies N/V, SOB, WHITNEY, chest pain or palpitations.

## 2025-07-10 NOTE — H&P PST ADULT - PROBLEM SELECTOR PLAN 1
Scheduled for incisional hernia repair with Dr. Tarik Soto on 7/24/25.  Preop instructions and chlorh wash provided.  Questions answered.  CBC, BMP, A1C, MRSA, T/S drawn today.

## 2025-07-10 NOTE — H&P PST ADULT - NSICDXPASTMEDICALHX_GEN_ALL_CORE_FT
PAST MEDICAL HISTORY:  Abdominal wall hernia     Achalasia resolved with surgeries and procedures    BPH (benign prostatic hyperplasia)     Eczema     Ganglion cyst of Left wrist     Rectal fistula

## 2025-07-11 PROBLEM — A49.01 MSSA (METHICILLIN SUSCEPTIBLE STAPHYLOCOCCUS AUREUS): Status: ACTIVE | Noted: 2025-07-11

## 2025-07-11 RX ORDER — MUPIROCIN 20 MG/G
2 OINTMENT TOPICAL
Qty: 1 | Refills: 0 | Status: ACTIVE | COMMUNITY
Start: 2025-07-11 | End: 1900-01-01

## 2025-07-24 ENCOUNTER — TRANSCRIPTION ENCOUNTER (OUTPATIENT)
Age: 85
End: 2025-07-24

## 2025-07-24 ENCOUNTER — APPOINTMENT (OUTPATIENT)
Dept: SURGERY | Facility: HOSPITAL | Age: 85
End: 2025-07-24
Payer: MEDICARE

## 2025-07-24 ENCOUNTER — RESULT REVIEW (OUTPATIENT)
Age: 85
End: 2025-07-24

## 2025-07-24 ENCOUNTER — OUTPATIENT (OUTPATIENT)
Dept: INPATIENT UNIT | Facility: HOSPITAL | Age: 85
LOS: 1 days | End: 2025-07-24
Payer: MEDICARE

## 2025-07-24 VITALS
WEIGHT: 220.02 LBS | TEMPERATURE: 98 F | DIASTOLIC BLOOD PRESSURE: 81 MMHG | OXYGEN SATURATION: 97 % | RESPIRATION RATE: 16 BRPM | HEIGHT: 69 IN | SYSTOLIC BLOOD PRESSURE: 135 MMHG | HEART RATE: 61 BPM

## 2025-07-24 VITALS
RESPIRATION RATE: 16 BRPM | SYSTOLIC BLOOD PRESSURE: 151 MMHG | HEART RATE: 75 BPM | DIASTOLIC BLOOD PRESSURE: 79 MMHG | OXYGEN SATURATION: 95 %

## 2025-07-24 DIAGNOSIS — Z98.49 CATARACT EXTRACTION STATUS, UNSPECIFIED EYE: Chronic | ICD-10-CM

## 2025-07-24 DIAGNOSIS — Z98.890 OTHER SPECIFIED POSTPROCEDURAL STATES: Chronic | ICD-10-CM

## 2025-07-24 DIAGNOSIS — Z93.1 GASTROSTOMY STATUS: Chronic | ICD-10-CM

## 2025-07-24 DIAGNOSIS — K43.2 INCISIONAL HERNIA WITHOUT OBSTRUCTION OR GANGRENE: ICD-10-CM

## 2025-07-24 DIAGNOSIS — Z87.81 PERSONAL HISTORY OF (HEALED) TRAUMATIC FRACTURE: Chronic | ICD-10-CM

## 2025-07-24 LAB
GLUCOSE BLDC GLUCOMTR-MCNC: 108 MG/DL — HIGH (ref 70–99)
RH IG SCN BLD-IMP: POSITIVE — SIGNIFICANT CHANGE UP

## 2025-07-24 PROCEDURE — 49591 RPR AA HRN 1ST < 3 CM RDC: CPT

## 2025-07-24 PROCEDURE — 82962 GLUCOSE BLOOD TEST: CPT

## 2025-07-24 PROCEDURE — 49593 RPR AA HRN 1ST 3-10 RDC: CPT

## 2025-07-24 PROCEDURE — C1781: CPT

## 2025-07-24 PROCEDURE — C1889: CPT

## 2025-07-24 PROCEDURE — C9399: CPT

## 2025-07-24 PROCEDURE — 88302 TISSUE EXAM BY PATHOLOGIST: CPT | Mod: 26

## 2025-07-24 PROCEDURE — 88302 TISSUE EXAM BY PATHOLOGIST: CPT

## 2025-07-24 DEVICE — ETHICON HERNIA SECURESTRAP 5MM SIZE 25: Type: IMPLANTABLE DEVICE | Status: FUNCTIONAL

## 2025-07-24 DEVICE — IMPLANTABLE DEVICE: Type: IMPLANTABLE DEVICE | Status: FUNCTIONAL

## 2025-07-24 RX ORDER — ACETAMINOPHEN 500 MG/5ML
975 LIQUID (ML) ORAL EVERY 6 HOURS
Refills: 0 | Status: DISCONTINUED | OUTPATIENT
Start: 2025-07-24 | End: 2025-08-07

## 2025-07-24 RX ORDER — CELECOXIB 50 MG/1
400 CAPSULE ORAL ONCE
Refills: 0 | Status: COMPLETED | OUTPATIENT
Start: 2025-07-24 | End: 2025-07-24

## 2025-07-24 RX ORDER — OXYCODONE HYDROCHLORIDE 30 MG/1
1 TABLET ORAL
Qty: 6 | Refills: 0
Start: 2025-07-24

## 2025-07-24 RX ORDER — HYDROMORPHONE/SOD CHLOR,ISO/PF 2 MG/10 ML
0.25 SYRINGE (ML) INJECTION
Refills: 0 | Status: DISCONTINUED | OUTPATIENT
Start: 2025-07-24 | End: 2025-07-24

## 2025-07-24 RX ORDER — LIDOCAINE HCL/PF 10 MG/ML
0.2 VIAL (ML) INJECTION ONCE
Refills: 0 | Status: DISCONTINUED | OUTPATIENT
Start: 2025-07-24 | End: 2025-07-24

## 2025-07-24 RX ORDER — HYDROMORPHONE/SOD CHLOR,ISO/PF 2 MG/10 ML
0.5 SYRINGE (ML) INJECTION
Refills: 0 | Status: DISCONTINUED | OUTPATIENT
Start: 2025-07-24 | End: 2025-07-24

## 2025-07-24 RX ORDER — CEFAZOLIN SODIUM IN 0.9 % NACL 3 G/100 ML
2000 INTRAVENOUS SOLUTION, PIGGYBACK (ML) INTRAVENOUS ONCE
Refills: 0 | Status: COMPLETED | OUTPATIENT
Start: 2025-07-24 | End: 2025-07-24

## 2025-07-24 RX ORDER — ONDANSETRON HCL/PF 4 MG/2 ML
4 VIAL (ML) INJECTION ONCE
Refills: 0 | Status: DISCONTINUED | OUTPATIENT
Start: 2025-07-24 | End: 2025-07-24

## 2025-07-24 RX ORDER — OXYCODONE HYDROCHLORIDE 30 MG/1
5 TABLET ORAL EVERY 4 HOURS
Refills: 0 | Status: DISCONTINUED | OUTPATIENT
Start: 2025-07-24 | End: 2025-07-24

## 2025-07-24 RX ADMIN — CELECOXIB 400 MILLIGRAM(S): 50 CAPSULE ORAL at 07:01

## 2025-07-24 RX ADMIN — Medication 3 MILLILITER(S): at 06:50

## 2025-07-24 RX ADMIN — Medication 0.5 MILLIGRAM(S): at 10:10

## 2025-07-24 RX ADMIN — Medication 0.5 MILLIGRAM(S): at 10:30

## 2025-07-24 RX ADMIN — Medication 1 APPLICATION(S): at 06:15

## 2025-07-25 PROBLEM — N40.0 BENIGN PROSTATIC HYPERPLASIA WITHOUT LOWER URINARY TRACT SYMPTOMS: Chronic | Status: ACTIVE | Noted: 2025-07-10

## 2025-07-25 PROBLEM — K43.9 VENTRAL HERNIA WITHOUT OBSTRUCTION OR GANGRENE: Chronic | Status: ACTIVE | Noted: 2025-07-10

## 2025-07-28 ENCOUNTER — NON-APPOINTMENT (OUTPATIENT)
Age: 85
End: 2025-07-28

## 2025-07-30 LAB — SURGICAL PATHOLOGY STUDY: SIGNIFICANT CHANGE UP

## 2025-08-07 ENCOUNTER — APPOINTMENT (OUTPATIENT)
Dept: SURGERY | Facility: CLINIC | Age: 85
End: 2025-08-07
Payer: MEDICARE

## 2025-08-07 VITALS
DIASTOLIC BLOOD PRESSURE: 69 MMHG | BODY MASS INDEX: 34.53 KG/M2 | HEIGHT: 67 IN | RESPIRATION RATE: 16 BRPM | HEART RATE: 72 BPM | OXYGEN SATURATION: 99 % | SYSTOLIC BLOOD PRESSURE: 133 MMHG | TEMPERATURE: 97.6 F | WEIGHT: 220 LBS

## 2025-08-07 DIAGNOSIS — K43.2 INCISIONAL HERNIA W/OUT OBSTRUCTION OR GANGRENE: ICD-10-CM

## 2025-08-07 PROCEDURE — 99212 OFFICE O/P EST SF 10 MIN: CPT

## 2025-09-09 ENCOUNTER — APPOINTMENT (OUTPATIENT)
Dept: SURGERY | Facility: CLINIC | Age: 85
End: 2025-09-09

## 2025-09-09 VITALS
WEIGHT: 219 LBS | SYSTOLIC BLOOD PRESSURE: 135 MMHG | OXYGEN SATURATION: 97 % | HEIGHT: 67 IN | RESPIRATION RATE: 18 BRPM | BODY MASS INDEX: 34.37 KG/M2 | TEMPERATURE: 96.3 F | HEART RATE: 71 BPM | DIASTOLIC BLOOD PRESSURE: 65 MMHG

## 2025-09-09 DIAGNOSIS — Z09 ENCOUNTER FOR FOLLOW-UP EXAMINATION AFTER COMPLETED TREATMENT FOR CONDITIONS OTHER THAN MALIGNANT NEOPLASM: ICD-10-CM

## (undated) DEVICE — SUT MAXON 0 36" T-12

## (undated) DEVICE — BLANKET WARMER LOWER ADULT

## (undated) DEVICE — DRSG MASTISOL

## (undated) DEVICE — GLV 7.5 PROTEXIS

## (undated) DEVICE — SYR ASEPTO

## (undated) DEVICE — CATH IV SAFE INSYTE 18G X 1.16" (GREEN)

## (undated) DEVICE — WARMING BLANKET UPPER ADULT

## (undated) DEVICE — TUBING IV SET GRAVITY 3Y 100" MACRO

## (undated) DEVICE — POSITIONER FOAM EGG CRATE ULNAR 2PCS (PINK)

## (undated) DEVICE — GLV 8 ESTEEM BLUE

## (undated) DEVICE — DRSG ACE BANDAGE 4"

## (undated) DEVICE — LUBRICATING JELLY ONESHOT 1.25OZ

## (undated) DEVICE — DRSG WEBRIL 4"

## (undated) DEVICE — SPECIMEN CONTAINER 100ML

## (undated) DEVICE — NDL INJ SCLERO INTERJECT 23G

## (undated) DEVICE — GOWN XL EXTRA LONG

## (undated) DEVICE — ELCTR PENCIL NEPTUNE SMOKE EVACUATION

## (undated) DEVICE — TUBING SUCTION 20FT

## (undated) DEVICE — DRSG STERISTRIPS 0.5 X 4"

## (undated) DEVICE — LIGASURE IMPACT

## (undated) DEVICE — NDL HYPO SAFE 22G X 1.5" (BLACK)

## (undated) DEVICE — VESSEL LOOP MAXI-BLUE 0.120" X 16"

## (undated) DEVICE — SUT SURGIPRO 1 30" GS-21

## (undated) DEVICE — CATH IV SAFE BC 22G X 1" (BLUE)

## (undated) DEVICE — SNARE LRG

## (undated) DEVICE — SOL IRR POUR NS 0.9% 500ML

## (undated) DEVICE — FORCEP RADIAL JAW 4 JUMBO 2.8MM 3.2MM 240CM ORANGE DISP

## (undated) DEVICE — DRAPE INSTRUMENT POUCH 6.75" X 11"

## (undated) DEVICE — STAPLER SKIN VISI-STAT 35 WIDE

## (undated) DEVICE — GLV 7.5 PROTEXIS (WHITE)

## (undated) DEVICE — TAPE SILK 3"

## (undated) DEVICE — SUT MONOCRYL 4-0 18" PS-2

## (undated) DEVICE — DRSG KLING 2"

## (undated) DEVICE — VENODYNE/SCD SLEEVE CALF MEDIUM

## (undated) DEVICE — FOLEY HOLDER STATLOCK 2 WAY ADULT

## (undated) DEVICE — DRSG SLING ARM MED

## (undated) DEVICE — BLADE SAFETY LOCK #10

## (undated) DEVICE — SUT MONOCRYL 4-0 18" P-3 UNDYED

## (undated) DEVICE — SUT SOFSILK 0 18" TIES

## (undated) DEVICE — SOL IRR POUR H2O 250ML

## (undated) DEVICE — DRAPE TOWEL BLUE STICKY

## (undated) DEVICE — MEDICATION LABELS W MARKER

## (undated) DEVICE — DRSG 4X4

## (undated) DEVICE — WRAP COMPRESSION CALF MED

## (undated) DEVICE — ABDOMINAL BINDER MED/LG 12" X 45"-62"

## (undated) DEVICE — DRILL BIT SYNTHES ORTHO QUICK COUPLING 2.8X165MM

## (undated) DEVICE — ELCTR BOVIE PENCIL SMOKE EVACUATION

## (undated) DEVICE — SUT CHROMIC 2-0 30" GS-22

## (undated) DEVICE — TUBING SUCTION NONCONDUCTIVE 6MM X 12FT

## (undated) DEVICE — DRAPE IOBAN 23" X 23"

## (undated) DEVICE — DRILL BIT SYNTHES ORTHO QC 2.5X110MM

## (undated) DEVICE — SUT ETHIBOND 2-0 30" RB-1

## (undated) DEVICE — MARKING PEN W RULER

## (undated) DEVICE — DRAPE THYROID 77" X 123"

## (undated) DEVICE — Device

## (undated) DEVICE — SUT POLYSORB 3-0 30" V-20 UNDYED

## (undated) DEVICE — DRAPE TOP SHEET 53"X101"

## (undated) DEVICE — NDL 1/2 TAPER MAYO FREE NEEDLES .0.050X1.102"

## (undated) DEVICE — DRAPE TOWEL BLUE 17" X 24"

## (undated) DEVICE — PACK MAJOR ABDOMINAL SUPINE

## (undated) DEVICE — DRSG TEGADERM 4X10"

## (undated) DEVICE — SUCTION YANKAUER NO CONTROL VENT

## (undated) DEVICE — BLADE SCALPEL SAFETYLOCK #15

## (undated) DEVICE — NDL INJ SCLERO INTERJECT 25G

## (undated) DEVICE — BLADE SCALPEL SAFETYLOCK #10

## (undated) DEVICE — SUT SOFSILK 0 30" V-20

## (undated) DEVICE — TUBING SUCTION CONN 6FT STERILE

## (undated) DEVICE — PACK MINOR

## (undated) DEVICE — DRSG STERISTRIPS 0.5X4"

## (undated) DEVICE — PACK LOWER EXTREMITY

## (undated) DEVICE — DRSG ACE BANDAGE 4" NS

## (undated) DEVICE — SYR LUER LOK 10CC

## (undated) DEVICE — SENSOR O2 FINGER ADULT 24/CA

## (undated) DEVICE — DRAPE MAYO STAND 23"

## (undated) DEVICE — CATH IV SAFE BC 20G X 1.16" (PINK)

## (undated) DEVICE — VESSEL LOOP EXTRA MAXI-BLUE 0.200" X 22"

## (undated) DEVICE — SUT POLYSORB 2-0 30" V-20 UNDYED

## (undated) DEVICE — ABDOMINAL BINDER XL 9" X 62"-74"

## (undated) DEVICE — PACK IV START WITH CHG

## (undated) DEVICE — BITE BLOCK ADULT 20 X 27MM (GREEN)

## (undated) DEVICE — DRAPE SPLIT SHEETS 77X120"

## (undated) DEVICE — PACK TOTAL HIP

## (undated) DEVICE — SUT VICRYL 3-0 27" RB-1 UNDYED

## (undated) DEVICE — SYR ALLIANCE II INFLATION 60ML

## (undated) DEVICE — DRSG XEROFORM 5"

## (undated) DEVICE — BRUSH COLONOSCOPY CYTOLOGY

## (undated) DEVICE — SUT ORTHOCORD 2 36"

## (undated) DEVICE — DRAPE C ARM UNIVERSAL

## (undated) DEVICE — NDL HYPO REGULAR BEVEL 22G X 1.5" (TURQUOISE)

## (undated) DEVICE — DRSG COMBINE 5 X 9"

## (undated) DEVICE — SOL INJ NS 0.9% 500ML 2 PORT